# Patient Record
Sex: MALE | Race: WHITE | NOT HISPANIC OR LATINO
[De-identification: names, ages, dates, MRNs, and addresses within clinical notes are randomized per-mention and may not be internally consistent; named-entity substitution may affect disease eponyms.]

---

## 2017-03-24 ENCOUNTER — APPOINTMENT (OUTPATIENT)
Dept: UROLOGY | Facility: CLINIC | Age: 67
End: 2017-03-24

## 2017-03-24 ENCOUNTER — APPOINTMENT (OUTPATIENT)
Dept: UROLOGY | Facility: HOSPITAL | Age: 67
End: 2017-03-24

## 2017-03-24 VITALS
DIASTOLIC BLOOD PRESSURE: 70 MMHG | SYSTOLIC BLOOD PRESSURE: 122 MMHG | HEART RATE: 73 BPM | HEIGHT: 68 IN | OXYGEN SATURATION: 98 % | BODY MASS INDEX: 25.76 KG/M2 | WEIGHT: 170 LBS

## 2017-03-25 LAB — PSA SERPL-MCNC: 2.77 NG/ML

## 2017-08-27 ENCOUNTER — LABORATORY RESULT (OUTPATIENT)
Age: 67
End: 2017-08-27

## 2017-08-28 ENCOUNTER — APPOINTMENT (OUTPATIENT)
Dept: DERMATOLOGY | Facility: CLINIC | Age: 67
End: 2017-08-28
Payer: MEDICARE

## 2017-08-28 VITALS — SYSTOLIC BLOOD PRESSURE: 114 MMHG | DIASTOLIC BLOOD PRESSURE: 66 MMHG

## 2017-08-28 PROCEDURE — 17000 DESTRUCT PREMALG LESION: CPT | Mod: GC

## 2017-08-28 PROCEDURE — 11100 BX SKIN SUBCUTANEOUS&/MUCOUS MEMBRANE 1 LESION: CPT | Mod: 59,GC

## 2017-08-28 PROCEDURE — 99213 OFFICE O/P EST LOW 20 MIN: CPT | Mod: 25,GC

## 2017-08-28 PROCEDURE — 17003 DESTRUCT PREMALG LES 2-14: CPT | Mod: GC

## 2017-09-05 ENCOUNTER — APPOINTMENT (OUTPATIENT)
Dept: DERMATOLOGY | Facility: CLINIC | Age: 67
End: 2017-09-05

## 2017-09-06 ENCOUNTER — MEDICATION RENEWAL (OUTPATIENT)
Age: 67
End: 2017-09-06

## 2017-10-02 ENCOUNTER — APPOINTMENT (OUTPATIENT)
Dept: DERMATOLOGY | Facility: CLINIC | Age: 67
End: 2017-10-02
Payer: MEDICARE

## 2017-10-02 VITALS — SYSTOLIC BLOOD PRESSURE: 112 MMHG | DIASTOLIC BLOOD PRESSURE: 68 MMHG

## 2017-10-02 DIAGNOSIS — L98.8 OTHER SPECIFIED DISORDERS OF THE SKIN AND SUBCUTANEOUS TISSUE: ICD-10-CM

## 2017-10-02 DIAGNOSIS — D48.9 NEOPLASM OF UNCERTAIN BEHAVIOR, UNSPECIFIED: ICD-10-CM

## 2017-10-02 DIAGNOSIS — T14.8 OTHER INJURY OF UNSPECIFIED BODY REGION: ICD-10-CM

## 2017-10-02 DIAGNOSIS — D48.5 NEOPLASM OF UNCERTAIN BEHAVIOR OF SKIN: ICD-10-CM

## 2017-10-02 PROCEDURE — 99213 OFFICE O/P EST LOW 20 MIN: CPT | Mod: GC

## 2017-12-21 ENCOUNTER — APPOINTMENT (OUTPATIENT)
Dept: DERMATOLOGY | Facility: CLINIC | Age: 67
End: 2017-12-21

## 2017-12-29 ENCOUNTER — APPOINTMENT (OUTPATIENT)
Dept: UROLOGY | Facility: CLINIC | Age: 67
End: 2017-12-29

## 2018-05-18 ENCOUNTER — APPOINTMENT (OUTPATIENT)
Dept: DERMATOLOGY | Facility: CLINIC | Age: 68
End: 2018-05-18
Payer: MEDICARE

## 2018-05-18 VITALS — DIASTOLIC BLOOD PRESSURE: 80 MMHG | SYSTOLIC BLOOD PRESSURE: 108 MMHG

## 2018-05-18 DIAGNOSIS — D23.9 OTHER BENIGN NEOPLASM OF SKIN, UNSPECIFIED: ICD-10-CM

## 2018-05-18 DIAGNOSIS — L82.1 OTHER SEBORRHEIC KERATOSIS: ICD-10-CM

## 2018-05-18 PROCEDURE — 99214 OFFICE O/P EST MOD 30 MIN: CPT | Mod: 25,GC

## 2018-05-18 PROCEDURE — 17003 DESTRUCT PREMALG LES 2-14: CPT | Mod: GC

## 2018-05-18 PROCEDURE — 17000 DESTRUCT PREMALG LESION: CPT | Mod: GC

## 2018-10-01 ENCOUNTER — APPOINTMENT (OUTPATIENT)
Dept: DERMATOLOGY | Facility: CLINIC | Age: 68
End: 2018-10-01
Payer: MEDICARE

## 2018-10-01 VITALS — DIASTOLIC BLOOD PRESSURE: 82 MMHG | SYSTOLIC BLOOD PRESSURE: 122 MMHG

## 2018-10-01 PROCEDURE — 17000 DESTRUCT PREMALG LESION: CPT

## 2018-10-01 PROCEDURE — 99214 OFFICE O/P EST MOD 30 MIN: CPT | Mod: 25

## 2018-10-08 ENCOUNTER — TRANSCRIPTION ENCOUNTER (OUTPATIENT)
Age: 68
End: 2018-10-08

## 2018-10-08 ENCOUNTER — MEDICATION RENEWAL (OUTPATIENT)
Age: 68
End: 2018-10-08

## 2018-11-05 PROBLEM — D23.9 BLUE NEVUS: Status: ACTIVE | Noted: 2018-05-18

## 2019-04-25 ENCOUNTER — TRANSCRIPTION ENCOUNTER (OUTPATIENT)
Age: 69
End: 2019-04-25

## 2019-05-02 ENCOUNTER — TRANSCRIPTION ENCOUNTER (OUTPATIENT)
Age: 69
End: 2019-05-02

## 2019-05-03 ENCOUNTER — APPOINTMENT (OUTPATIENT)
Dept: DERMATOLOGY | Facility: CLINIC | Age: 69
End: 2019-05-03
Payer: MEDICARE

## 2019-05-03 VITALS — SYSTOLIC BLOOD PRESSURE: 126 MMHG | DIASTOLIC BLOOD PRESSURE: 72 MMHG

## 2019-05-03 PROCEDURE — 99214 OFFICE O/P EST MOD 30 MIN: CPT

## 2019-05-13 ENCOUNTER — APPOINTMENT (OUTPATIENT)
Dept: FAMILY MEDICINE | Facility: CLINIC | Age: 69
End: 2019-05-13
Payer: MEDICARE

## 2019-05-13 VITALS
BODY MASS INDEX: 25.76 KG/M2 | WEIGHT: 170 LBS | SYSTOLIC BLOOD PRESSURE: 120 MMHG | HEIGHT: 68 IN | DIASTOLIC BLOOD PRESSURE: 80 MMHG | RESPIRATION RATE: 16 BRPM | OXYGEN SATURATION: 99 % | HEART RATE: 74 BPM

## 2019-05-13 DIAGNOSIS — E78.5 HYPERLIPIDEMIA, UNSPECIFIED: ICD-10-CM

## 2019-05-13 DIAGNOSIS — Z00.00 ENCOUNTER FOR GENERAL ADULT MEDICAL EXAMINATION W/OUT ABNORMAL FINDINGS: ICD-10-CM

## 2019-05-13 DIAGNOSIS — Z12.5 ENCOUNTER FOR SCREENING FOR MALIGNANT NEOPLASM OF PROSTATE: ICD-10-CM

## 2019-05-13 PROCEDURE — 36415 COLL VENOUS BLD VENIPUNCTURE: CPT

## 2019-05-13 PROCEDURE — G0438: CPT

## 2019-05-14 LAB
25(OH)D3 SERPL-MCNC: 38.7 NG/ML
ALBUMIN SERPL ELPH-MCNC: 4.4 G/DL
ALP BLD-CCNC: 50 U/L
ALT SERPL-CCNC: 15 U/L
ANION GAP SERPL CALC-SCNC: 16 MMOL/L
APPEARANCE: CLEAR
AST SERPL-CCNC: 18 U/L
BACTERIA: NEGATIVE
BASOPHILS # BLD AUTO: 0.07 K/UL
BASOPHILS NFR BLD AUTO: 0.9 %
BILIRUB SERPL-MCNC: 0.5 MG/DL
BILIRUBIN URINE: NEGATIVE
BLOOD URINE: NEGATIVE
BUN SERPL-MCNC: 19 MG/DL
CALCIUM SERPL-MCNC: 9.6 MG/DL
CHLORIDE SERPL-SCNC: 101 MMOL/L
CHOLEST SERPL-MCNC: 165 MG/DL
CHOLEST/HDLC SERPL: 2.4 RATIO
CO2 SERPL-SCNC: 24 MMOL/L
COLOR: NORMAL
CREAT SERPL-MCNC: 0.93 MG/DL
EOSINOPHIL # BLD AUTO: 0.23 K/UL
EOSINOPHIL NFR BLD AUTO: 3.1 %
GLUCOSE QUALITATIVE U: NEGATIVE
GLUCOSE SERPL-MCNC: 101 MG/DL
HCT VFR BLD CALC: 44.3 %
HDLC SERPL-MCNC: 68 MG/DL
HGB BLD-MCNC: 14.7 G/DL
HYALINE CASTS: 0 /LPF
IMM GRANULOCYTES NFR BLD AUTO: 0.3 %
KETONES URINE: NEGATIVE
LDLC SERPL CALC-MCNC: 81 MG/DL
LEUKOCYTE ESTERASE URINE: NEGATIVE
LYMPHOCYTES # BLD AUTO: 1.43 K/UL
LYMPHOCYTES NFR BLD AUTO: 19.1 %
MAN DIFF?: NORMAL
MCHC RBC-ENTMCNC: 30.4 PG
MCHC RBC-ENTMCNC: 33.2 GM/DL
MCV RBC AUTO: 91.7 FL
MICROSCOPIC-UA: NORMAL
MONOCYTES # BLD AUTO: 0.48 K/UL
MONOCYTES NFR BLD AUTO: 6.4 %
NEUTROPHILS # BLD AUTO: 5.26 K/UL
NEUTROPHILS NFR BLD AUTO: 70.2 %
NITRITE URINE: NEGATIVE
PH URINE: 6.5
PLATELET # BLD AUTO: 279 K/UL
POTASSIUM SERPL-SCNC: 4.6 MMOL/L
PROT SERPL-MCNC: 6.5 G/DL
PROTEIN URINE: NEGATIVE
PSA SERPL-MCNC: 3.02 NG/ML
RBC # BLD: 4.83 M/UL
RBC # FLD: 13.3 %
RED BLOOD CELLS URINE: 1 /HPF
SODIUM SERPL-SCNC: 141 MMOL/L
SPECIFIC GRAVITY URINE: 1.01
SQUAMOUS EPITHELIAL CELLS: 0 /HPF
TRIGL SERPL-MCNC: 82 MG/DL
TSH SERPL-ACNC: 1.05 UIU/ML
UROBILINOGEN URINE: NORMAL
WBC # FLD AUTO: 7.49 K/UL
WHITE BLOOD CELLS URINE: 0 /HPF

## 2019-05-15 LAB
ESTIMATED AVERAGE GLUCOSE: 111 MG/DL
HBA1C MFR BLD HPLC: 5.5 %

## 2019-08-22 ENCOUNTER — RX RENEWAL (OUTPATIENT)
Age: 69
End: 2019-08-22

## 2019-08-30 ENCOUNTER — APPOINTMENT (OUTPATIENT)
Dept: INTERNAL MEDICINE | Facility: CLINIC | Age: 69
End: 2019-08-30
Payer: MEDICARE

## 2019-08-30 VITALS
HEIGHT: 68 IN | WEIGHT: 170 LBS | OXYGEN SATURATION: 99 % | BODY MASS INDEX: 25.76 KG/M2 | DIASTOLIC BLOOD PRESSURE: 60 MMHG | HEART RATE: 74 BPM | SYSTOLIC BLOOD PRESSURE: 120 MMHG

## 2019-08-30 DIAGNOSIS — Z83.79 FAMILY HISTORY OF OTHER DISEASES OF THE DIGESTIVE SYSTEM: ICD-10-CM

## 2019-08-30 PROCEDURE — 99204 OFFICE O/P NEW MOD 45 MIN: CPT

## 2019-08-30 NOTE — ASSESSMENT
[FreeTextEntry1] : Needs screening colon\par gerd issues and family history will also need egd\par \par on baby aspirin for no indication can stop 3-4 day prior

## 2019-08-30 NOTE — HISTORY OF PRESENT ILLNESS
[FreeTextEntry1] : Last colon 10 years ago\par no family history of colon issue\par no bowel problems\par family history of ulcer disease and cirrhosis\par some issue with gerd worse at night\par

## 2019-09-19 ENCOUNTER — LABORATORY RESULT (OUTPATIENT)
Age: 69
End: 2019-09-19

## 2019-09-19 ENCOUNTER — APPOINTMENT (OUTPATIENT)
Dept: INTERNAL MEDICINE | Facility: CLINIC | Age: 69
End: 2019-09-19
Payer: MEDICARE

## 2019-09-19 DIAGNOSIS — Z12.11 ENCOUNTER FOR SCREENING FOR MALIGNANT NEOPLASM OF COLON: ICD-10-CM

## 2019-09-19 PROCEDURE — G0121 COLON CA SCRN NOT HI RSK IND: CPT

## 2019-09-19 PROCEDURE — 43239 EGD BIOPSY SINGLE/MULTIPLE: CPT | Mod: 52

## 2019-10-07 ENCOUNTER — APPOINTMENT (OUTPATIENT)
Dept: DERMATOLOGY | Facility: CLINIC | Age: 69
End: 2019-10-07
Payer: MEDICARE

## 2019-10-07 PROCEDURE — 17000 DESTRUCT PREMALG LESION: CPT

## 2019-10-07 PROCEDURE — 99214 OFFICE O/P EST MOD 30 MIN: CPT | Mod: 25

## 2019-10-07 PROCEDURE — 17003 DESTRUCT PREMALG LES 2-14: CPT

## 2019-10-07 NOTE — HISTORY OF PRESENT ILLNESS
[FreeTextEntry1] : F/u: actinic keratoses [de-identified] : 68 yo M w/ PMH of NMSC (L upper back, type unknown, >10 yrs ago) here for follow up of:\par \par Notes a growth on the R ear lobule and L upper arm. Itchy. Flakes off and scabs up again. \par \par 2) Rosacea. Using clindamycin qAM to face and upper chest with good improvement\par 2) Seb derm on face/scalp. Using desonide as needed with improvement.

## 2020-09-30 ENCOUNTER — APPOINTMENT (OUTPATIENT)
Dept: INTERNAL MEDICINE | Facility: CLINIC | Age: 70
End: 2020-09-30
Payer: MEDICARE

## 2020-09-30 VITALS
WEIGHT: 173 LBS | TEMPERATURE: 97.7 F | HEIGHT: 68 IN | SYSTOLIC BLOOD PRESSURE: 121 MMHG | OXYGEN SATURATION: 95 % | HEART RATE: 64 BPM | DIASTOLIC BLOOD PRESSURE: 75 MMHG | BODY MASS INDEX: 26.22 KG/M2

## 2020-09-30 VITALS
WEIGHT: 173 LBS | BODY MASS INDEX: 26.22 KG/M2 | DIASTOLIC BLOOD PRESSURE: 70 MMHG | HEIGHT: 68 IN | SYSTOLIC BLOOD PRESSURE: 120 MMHG

## 2020-09-30 DIAGNOSIS — K22.70 BARRETT'S ESOPHAGUS W/OUT DYSPLASIA: ICD-10-CM

## 2020-09-30 DIAGNOSIS — Z23 ENCOUNTER FOR IMMUNIZATION: ICD-10-CM

## 2020-09-30 DIAGNOSIS — K57.92 DIVERTICULITIS OF INTESTINE, PART UNSPECIFIED, W/OUT PERFORATION OR ABSCESS W/OUT BLEEDING: ICD-10-CM

## 2020-09-30 DIAGNOSIS — K57.90 DIVERTICULOSIS OF INTESTINE, PART UNSPECIFIED, W/OUT PERFORATION OR ABSCESS W/OUT BLEEDING: ICD-10-CM

## 2020-09-30 PROCEDURE — G0009: CPT

## 2020-09-30 PROCEDURE — 99214 OFFICE O/P EST MOD 30 MIN: CPT | Mod: 25

## 2020-09-30 PROCEDURE — 90732 PPSV23 VACC 2 YRS+ SUBQ/IM: CPT

## 2020-09-30 NOTE — PHYSICAL EXAM
[PERRL With Normal Accommodation] : pupils were equal in size, round, and reactive to light [Neck Appearance] : the appearance of the neck was normal [Sclera] : the sclera and conjunctiva were normal [] : no respiratory distress [Apical Impulse] : the apical impulse was normal [Respiration, Rhythm And Depth] : normal respiratory rhythm and effort [Heart Rate And Rhythm] : heart rate was normal and rhythm regular [Arterial Pulses Carotid] : carotid pulses were normal with no bruits [Abdominal Aorta] : the abdominal aorta was normal [Bowel Sounds] : normal bowel sounds [Abdomen Soft] : soft

## 2020-09-30 NOTE — ASSESSMENT
[FreeTextEntry1] : Possible asymptomatic diverticulitis\par augmentim 875 bid x 5 days\par continue omeprazole for gerd and f/u egd 1 year\par \par

## 2021-01-13 ENCOUNTER — TRANSCRIPTION ENCOUNTER (OUTPATIENT)
Age: 71
End: 2021-01-13

## 2021-02-12 ENCOUNTER — APPOINTMENT (OUTPATIENT)
Dept: DERMATOLOGY | Facility: CLINIC | Age: 71
End: 2021-02-12
Payer: MEDICARE

## 2021-02-12 DIAGNOSIS — L21.9 SEBORRHEIC DERMATITIS, UNSPECIFIED: ICD-10-CM

## 2021-02-12 DIAGNOSIS — Z85.828 PERSONAL HISTORY OF OTHER MALIGNANT NEOPLASM OF SKIN: ICD-10-CM

## 2021-02-12 DIAGNOSIS — Z12.83 ENCOUNTER FOR SCREENING FOR MALIGNANT NEOPLASM OF SKIN: ICD-10-CM

## 2021-02-12 DIAGNOSIS — L57.0 ACTINIC KERATOSIS: ICD-10-CM

## 2021-02-12 DIAGNOSIS — L73.9 FOLLICULAR DISORDER, UNSPECIFIED: ICD-10-CM

## 2021-02-12 DIAGNOSIS — L71.9 ROSACEA, UNSPECIFIED: ICD-10-CM

## 2021-02-12 PROCEDURE — 17000 DESTRUCT PREMALG LESION: CPT

## 2021-02-12 PROCEDURE — 99214 OFFICE O/P EST MOD 30 MIN: CPT | Mod: 25

## 2021-02-12 NOTE — HISTORY OF PRESENT ILLNESS
[FreeTextEntry1] : F/u: actinic keratoses; bumps on the body [de-identified] : 72 yo M w/ PMH of NMSC (L upper back, type unknown, >10 yrs ago) here for follow up of:\par \par Notes itchy bumps that start as a white head on the arms and chest that improve with clindamycin. \par \par Requesting refill of Keto shampoo for Seb Derm

## 2021-02-12 NOTE — PHYSICAL EXAM
[Alert] : alert [Oriented x 3] : ~L oriented x 3 [Well Nourished] : well nourished [Conjunctiva Non-injected] : conjunctiva non-injected [No Visual Lymphadenopathy] : no visual  lymphadenopathy [No Clubbing] : no clubbing [No Edema] : no edema [No Bromhidrosis] : no bromhidrosis [No Chromhidrosis] : no chromhidrosis [FreeTextEntry3] : The patient is well-appearing, in no acute distress, alert and oriented x 3. Mood and affect are normal. A complete cutaneous examination of the scalp, face, neck, chest, abdomen, back, bilateral arms, bilateral legs, buttocks, digits, nails, eyelids, conjunctiva and lips reveals the following significant findings:\par  \par gritty erythematous papule on L sideburn\par Few folliculocentric excoriated papules on the L shoulder and arm\par Rare pustules on the cheeks\par Well-healed scar on the back\par

## 2021-03-08 ENCOUNTER — NON-APPOINTMENT (OUTPATIENT)
Age: 71
End: 2021-03-08

## 2021-03-08 ENCOUNTER — TRANSCRIPTION ENCOUNTER (OUTPATIENT)
Age: 71
End: 2021-03-08

## 2021-03-08 ENCOUNTER — INPATIENT (INPATIENT)
Facility: HOSPITAL | Age: 71
LOS: 2 days | Discharge: HOME | End: 2021-03-11
Attending: SURGERY | Admitting: SURGERY
Payer: MEDICARE

## 2021-03-08 VITALS
RESPIRATION RATE: 20 BRPM | HEART RATE: 100 BPM | DIASTOLIC BLOOD PRESSURE: 90 MMHG | SYSTOLIC BLOOD PRESSURE: 140 MMHG | TEMPERATURE: 98 F | OXYGEN SATURATION: 96 %

## 2021-03-08 DIAGNOSIS — Z90.49 ACQUIRED ABSENCE OF OTHER SPECIFIED PARTS OF DIGESTIVE TRACT: Chronic | ICD-10-CM

## 2021-03-08 DIAGNOSIS — K81.0 ACUTE CHOLECYSTITIS: ICD-10-CM

## 2021-03-08 DIAGNOSIS — Z98.890 OTHER SPECIFIED POSTPROCEDURAL STATES: Chronic | ICD-10-CM

## 2021-03-08 LAB
ALBUMIN SERPL ELPH-MCNC: 3.8 G/DL — SIGNIFICANT CHANGE UP (ref 3.3–5)
ALP SERPL-CCNC: 63 U/L — SIGNIFICANT CHANGE UP (ref 40–120)
ALT FLD-CCNC: 18 U/L — SIGNIFICANT CHANGE UP (ref 4–41)
ANION GAP SERPL CALC-SCNC: 10 MMOL/L — SIGNIFICANT CHANGE UP (ref 7–14)
AST SERPL-CCNC: 19 U/L — SIGNIFICANT CHANGE UP (ref 4–40)
BASOPHILS # BLD AUTO: 0.04 K/UL — SIGNIFICANT CHANGE UP (ref 0–0.2)
BASOPHILS NFR BLD AUTO: 0.2 % — SIGNIFICANT CHANGE UP (ref 0–2)
BILIRUB SERPL-MCNC: 0.8 MG/DL — SIGNIFICANT CHANGE UP (ref 0.2–1.2)
BUN SERPL-MCNC: 16 MG/DL — SIGNIFICANT CHANGE UP (ref 7–23)
CALCIUM SERPL-MCNC: 9.6 MG/DL — SIGNIFICANT CHANGE UP (ref 8.4–10.5)
CHLORIDE SERPL-SCNC: 98 MMOL/L — SIGNIFICANT CHANGE UP (ref 98–107)
CO2 SERPL-SCNC: 26 MMOL/L — SIGNIFICANT CHANGE UP (ref 22–31)
CREAT SERPL-MCNC: 1.11 MG/DL — SIGNIFICANT CHANGE UP (ref 0.5–1.3)
EOSINOPHIL # BLD AUTO: 0.08 K/UL — SIGNIFICANT CHANGE UP (ref 0–0.5)
EOSINOPHIL NFR BLD AUTO: 0.4 % — SIGNIFICANT CHANGE UP (ref 0–6)
GLUCOSE SERPL-MCNC: 134 MG/DL — HIGH (ref 70–99)
HCT VFR BLD CALC: 40.3 % — SIGNIFICANT CHANGE UP (ref 39–50)
HGB BLD-MCNC: 13.1 G/DL — SIGNIFICANT CHANGE UP (ref 13–17)
IANC: 19.57 K/UL — HIGH (ref 1.5–8.5)
IMM GRANULOCYTES NFR BLD AUTO: 0.6 % — SIGNIFICANT CHANGE UP (ref 0–1.5)
LIDOCAIN IGE QN: 13 U/L — SIGNIFICANT CHANGE UP (ref 7–60)
LYMPHOCYTES # BLD AUTO: 0.71 K/UL — LOW (ref 1–3.3)
LYMPHOCYTES # BLD AUTO: 3.2 % — LOW (ref 13–44)
MCHC RBC-ENTMCNC: 29.6 PG — SIGNIFICANT CHANGE UP (ref 27–34)
MCHC RBC-ENTMCNC: 32.5 GM/DL — SIGNIFICANT CHANGE UP (ref 32–36)
MCV RBC AUTO: 91 FL — SIGNIFICANT CHANGE UP (ref 80–100)
MONOCYTES # BLD AUTO: 1.39 K/UL — HIGH (ref 0–0.9)
MONOCYTES NFR BLD AUTO: 6.3 % — SIGNIFICANT CHANGE UP (ref 2–14)
NEUTROPHILS # BLD AUTO: 19.57 K/UL — HIGH (ref 1.8–7.4)
NEUTROPHILS NFR BLD AUTO: 89.3 % — HIGH (ref 43–77)
NRBC # BLD: 0 /100 WBCS — SIGNIFICANT CHANGE UP
NRBC # FLD: 0 K/UL — SIGNIFICANT CHANGE UP
PLATELET # BLD AUTO: 279 K/UL — SIGNIFICANT CHANGE UP (ref 150–400)
POTASSIUM SERPL-MCNC: 4 MMOL/L — SIGNIFICANT CHANGE UP (ref 3.5–5.3)
POTASSIUM SERPL-SCNC: 4 MMOL/L — SIGNIFICANT CHANGE UP (ref 3.5–5.3)
PROT SERPL-MCNC: 7.2 G/DL — SIGNIFICANT CHANGE UP (ref 6–8.3)
RBC # BLD: 4.43 M/UL — SIGNIFICANT CHANGE UP (ref 4.2–5.8)
RBC # FLD: 12.9 % — SIGNIFICANT CHANGE UP (ref 10.3–14.5)
SARS-COV-2 RNA SPEC QL NAA+PROBE: SIGNIFICANT CHANGE UP
SODIUM SERPL-SCNC: 134 MMOL/L — LOW (ref 135–145)
WBC # BLD: 21.92 K/UL — HIGH (ref 3.8–10.5)
WBC # FLD AUTO: 21.92 K/UL — HIGH (ref 3.8–10.5)

## 2021-03-08 PROCEDURE — 76705 ECHO EXAM OF ABDOMEN: CPT | Mod: 26

## 2021-03-08 PROCEDURE — 99285 EMERGENCY DEPT VISIT HI MDM: CPT | Mod: CS

## 2021-03-08 PROCEDURE — 71045 X-RAY EXAM CHEST 1 VIEW: CPT | Mod: 26

## 2021-03-08 RX ORDER — ENOXAPARIN SODIUM 100 MG/ML
40 INJECTION SUBCUTANEOUS DAILY
Refills: 0 | Status: DISCONTINUED | OUTPATIENT
Start: 2021-03-08 | End: 2021-03-09

## 2021-03-08 RX ORDER — SODIUM CHLORIDE 9 MG/ML
1000 INJECTION INTRAMUSCULAR; INTRAVENOUS; SUBCUTANEOUS ONCE
Refills: 0 | Status: COMPLETED | OUTPATIENT
Start: 2021-03-08 | End: 2021-03-08

## 2021-03-08 RX ORDER — ASPIRIN/CALCIUM CARB/MAGNESIUM 324 MG
81 TABLET ORAL DAILY
Refills: 0 | Status: DISCONTINUED | OUTPATIENT
Start: 2021-03-08 | End: 2021-03-09

## 2021-03-08 RX ORDER — PIPERACILLIN AND TAZOBACTAM 4; .5 G/20ML; G/20ML
3.38 INJECTION, POWDER, LYOPHILIZED, FOR SOLUTION INTRAVENOUS EVERY 8 HOURS
Refills: 0 | Status: DISCONTINUED | OUTPATIENT
Start: 2021-03-08 | End: 2021-03-08

## 2021-03-08 RX ORDER — SIMVASTATIN 20 MG/1
20 TABLET, FILM COATED ORAL AT BEDTIME
Refills: 0 | Status: DISCONTINUED | OUTPATIENT
Start: 2021-03-08 | End: 2021-03-11

## 2021-03-08 RX ORDER — PANTOPRAZOLE SODIUM 20 MG/1
40 TABLET, DELAYED RELEASE ORAL
Refills: 0 | Status: DISCONTINUED | OUTPATIENT
Start: 2021-03-08 | End: 2021-03-11

## 2021-03-08 RX ORDER — SODIUM CHLORIDE 9 MG/ML
1000 INJECTION, SOLUTION INTRAVENOUS
Refills: 0 | Status: DISCONTINUED | OUTPATIENT
Start: 2021-03-08 | End: 2021-03-10

## 2021-03-08 RX ORDER — PIPERACILLIN AND TAZOBACTAM 4; .5 G/20ML; G/20ML
3.38 INJECTION, POWDER, LYOPHILIZED, FOR SOLUTION INTRAVENOUS EVERY 8 HOURS
Refills: 0 | Status: DISCONTINUED | OUTPATIENT
Start: 2021-03-08 | End: 2021-03-11

## 2021-03-08 RX ORDER — PIPERACILLIN AND TAZOBACTAM 4; .5 G/20ML; G/20ML
3.38 INJECTION, POWDER, LYOPHILIZED, FOR SOLUTION INTRAVENOUS ONCE
Refills: 0 | Status: COMPLETED | OUTPATIENT
Start: 2021-03-08 | End: 2021-03-08

## 2021-03-08 RX ORDER — PIPERACILLIN AND TAZOBACTAM 4; .5 G/20ML; G/20ML
3.38 INJECTION, POWDER, LYOPHILIZED, FOR SOLUTION INTRAVENOUS ONCE
Refills: 0 | Status: DISCONTINUED | OUTPATIENT
Start: 2021-03-08 | End: 2021-03-08

## 2021-03-08 RX ORDER — MORPHINE SULFATE 50 MG/1
4 CAPSULE, EXTENDED RELEASE ORAL ONCE
Refills: 0 | Status: DISCONTINUED | OUTPATIENT
Start: 2021-03-08 | End: 2021-03-08

## 2021-03-08 RX ORDER — ACETAMINOPHEN 500 MG
975 TABLET ORAL EVERY 6 HOURS
Refills: 0 | Status: DISCONTINUED | OUTPATIENT
Start: 2021-03-08 | End: 2021-03-09

## 2021-03-08 RX ADMIN — SODIUM CHLORIDE 1000 MILLILITER(S): 9 INJECTION INTRAMUSCULAR; INTRAVENOUS; SUBCUTANEOUS at 09:00

## 2021-03-08 RX ADMIN — SIMVASTATIN 20 MILLIGRAM(S): 20 TABLET, FILM COATED ORAL at 22:55

## 2021-03-08 RX ADMIN — Medication 975 MILLIGRAM(S): at 20:06

## 2021-03-08 RX ADMIN — Medication 975 MILLIGRAM(S): at 23:49

## 2021-03-08 RX ADMIN — PIPERACILLIN AND TAZOBACTAM 200 GRAM(S): 4; .5 INJECTION, POWDER, LYOPHILIZED, FOR SOLUTION INTRAVENOUS at 11:42

## 2021-03-08 RX ADMIN — Medication 975 MILLIGRAM(S): at 17:38

## 2021-03-08 RX ADMIN — PIPERACILLIN AND TAZOBACTAM 3.38 GRAM(S): 4; .5 INJECTION, POWDER, LYOPHILIZED, FOR SOLUTION INTRAVENOUS at 15:04

## 2021-03-08 RX ADMIN — PIPERACILLIN AND TAZOBACTAM 25 GRAM(S): 4; .5 INJECTION, POWDER, LYOPHILIZED, FOR SOLUTION INTRAVENOUS at 19:00

## 2021-03-08 RX ADMIN — SODIUM CHLORIDE 75 MILLILITER(S): 9 INJECTION, SOLUTION INTRAVENOUS at 15:33

## 2021-03-08 NOTE — H&P ADULT - ASSESSMENT
ASSESSMENT:  72 y/o male with history of HLD, Inguinal hernia repair, Appendectomy, and GERD presents with 3 days of abdominal n/w exam and labs concerning for acute cholecystitis. His RUQ U/S shows gallbladder wall thickening and pericholecystic fluid.    Plan:    - Case discussed with Dr. Jarret Peres  - NPO/ROCKY  - Zosyn for acute cholecystitis  - Planning for lap octavio this admission  - Continue home medications

## 2021-03-08 NOTE — ED PROVIDER NOTE - OBJECTIVE STATEMENT
71 yoM, PMHx of HLD presenting to ED with continued RUQ pain. Pain for 3 days. Seen at [facility] and had extensive workup with CTA CAP, RUQ sono and labs/trop. Cholelithiasis seen but otherwise reassuring workup. Pain persistent. Constant. No related to eating. No fever, chills, NVD. No SOB. Pain is near diaphragm on R/chest but more localized to RUQ. No tobacco use. No drug use. No etoh abuse.

## 2021-03-08 NOTE — ED ADULT NURSE NOTE - OBJECTIVE STATEMENT
Pt A+OX4 c/o right upper quadrant rib area pain since Friday.  Denies N/V/D.  Was seen at [facility] where he had an US and CT which were negative but pain continues and came here today.  +Trent's sign.  Appears SOBOE.  EKG done.  CM placed.  VSS.  Labs obtained and sent as ordered.  #20g SL R AC placed.  Kept NPO.

## 2021-03-08 NOTE — ED ADULT TRIAGE NOTE - CHIEF COMPLAINT QUOTE
pt c/o URQ pain with intermittent diffuse chest pain, SOB x 4 days. pt was evaluated at [facility] on Saturday with no significant findings, pain is still present. pt denies fever, cough, n/v/d. Hx of HLD.

## 2021-03-08 NOTE — H&P ADULT - NSHPREVIEWOFSYSTEMS_GEN_ALL_CORE
REVIEW OF SYSTEMS:  CONSTITUTIONAL: No fever, weight loss, or fatigue  EYES: No eye pain, visual disturbances, or discharge  ENMT:  No tinnitus, vertigo, dysphagia  RESPIRATORY: No cough, SOB, wheezing, chills or hemoptysis  CARDIOVASCULAR: No chest pain, palpitations, dizziness, or leg swelling  GASTROINTESTINAL: Having abdominal pain. No nausea, vomiting, or diarrhea.  GENITOURINARY: No dysuria, frequency, hematuria, or incontinence.  NEUROLOGICAL: No headaches, memory loss, loss of strength, numbness, or tremors  SKIN: No itching, burning, rashes, or lesions   ENDOCRINE: No heat or cold intolerance  MUSCULOSKELETAL: No joint pain or swelling

## 2021-03-08 NOTE — H&P ADULT - NSHPPHYSICALEXAM_GEN_ALL_CORE
General: NAD, resting comfortably  HEENT: NC/AT, EOMI, normal hearing  Pulmonary: normal resp effort  Cardiovascular: NSR  Abdominal: soft, ND, tender to palpation in epigastric region, - kelley sign  Extremities: WWP, normal strength, no clubbing/cyanosis/edema  Neuro: A/O x 3, no focal deficits  Pulses: palpable distal pulses

## 2021-03-08 NOTE — ED PROCEDURE NOTE - NS ED PROCEDURE ASSISTED BY
Assistance was available/The procedure was performed independently Supervision was available/Assistance was available/The procedure was performed independently

## 2021-03-08 NOTE — ED PROVIDER NOTE - ATTENDING CONTRIBUTION TO CARE
71M with right sided upper abdominal pain/lower right chest pain. patient states he was seen and evaluated at [facility] for similar, dx with gallstones, no acute cholecystitis and dc'd home. pain has persisted and now here for eval. + RUQ and right chest pain. no vomiting. only mild nausea.     ***GEN - NAD; well appearing; A+O x3 ***HEAD - NC/AT ***EYES/NOSE - PERRL, EOMI, mucous membranes moist, no discharge ***THROAT: Oral cavity and pharynx normal. No inflammation, swelling, exudate, or lesions.  ***NECK: Neck supple, non-tender without lymphadenopathy, no masses, no thyromegaly.   ***PULMONARY - CTA b/l, symmetric breath sounds. ***CARDIAC -s1s2, RRR, no M,G,R  ***ABDOMEN - +BS, ND, ttp RUQ soft, no guarding, no rebound, no masses   ***BACK - no CVA tenderness, Normal  spine ***EXTREMITIES - symmetric pulses, 2+ dp, capillary refill < 2 seconds, no clubbing, no cyanosis, no edema ***SKIN - no rash or bruising   ***NEUROLOGIC - alert, CN 2-12 intact    MDM: 71M with gallstones, r/o acute cholecystitis, choledocholithiasis, pancreatitis. labs, ruq us, pain control. 71M with right sided upper abdominal pain/lower right chest pain. patient states he was seen and evaluated at [facility] for similar, dx with gallstones, no acute cholecystitis and dc'd home. pain has persisted and now here for eval. + RUQ and right chest pain. no vomiting. only mild nausea.     ***GEN - NAD; well appearing; A+O x3 ***HEAD - NC/AT ***EYES/NOSE - PERRL, EOMI, mucous membranes moist, no discharge ***THROAT: Oral cavity and pharynx normal. No inflammation, swelling, exudate, or lesions.  ***NECK: Neck supple, non-tender without lymphadenopathy, no masses, no thyromegaly.   ***PULMONARY - CTA b/l, symmetric breath sounds. ***CARDIAC -s1s2, RRR, no M,G,R  ***ABDOMEN - +BS, ND, ttp RUQ soft, no guarding, no rebound, no masses   ***BACK - no CVA tenderness, Normal  spine ***EXTREMITIES - symmetric pulses, 2+ dp, capillary refill < 2 seconds, no clubbing, no cyanosis, no edema ***SKIN - no rash or bruising   ***NEUROLOGIC - alert, CN 2-12 intact.    MDM: 71M with gallstones, r/o acute cholecystitis, choledocholithiasis, pancreatitis. labs, ruq us, pain control.

## 2021-03-08 NOTE — ED PROVIDER NOTE - PROGRESS NOTE DETAILS
Antonio, PGY3- sono with immobile stone, concerning for acute octavio. Zosyn given. Admitted to Dr. Jarret Peres.

## 2021-03-08 NOTE — ED PROVIDER NOTE - CLINICAL SUMMARY MEDICAL DECISION MAKING FREE TEXT BOX
Antonio, PGY3- 71 yoM, PMHx of HLD presenting to ED with continued RUQ pain. Pain for 3 days. Seen at [facility] and had extensive workup with CTA CAP, RUQ sono and labs/trop. Cholelithiasis seen but otherwise reassuring workup.  given persistent pain and localized RUQ ttp will assess for hepatobiliary dz. Not consistent with ACS/PE. Normal aorta at OSH. No traumatic sequale on imaging.  analgesia, blood work, RUQ sono, reeval

## 2021-03-08 NOTE — ED PROVIDER NOTE - PHYSICAL EXAMINATION
General: alert, conversant, looks well, vitals reassuring  Head: atraumatic, normocephalic  Eyes: PERRL, EOMI, no scleral icterus  ENT: no epistaxis, normal phonation, airway patent  Neck: full ROM, no midline ttp  CV: RRR, no murmurs, HDS  Pulm: lungs CTA b/l, no wheezing, no respiratory distress  GI: RUQ ttp, no lower abd ttp, no masses, no distension   Back: normal ROM, no midline ttp, no signs of trauma  Extremities: normal ROM, joints stable, distal pulses intact, no edema  Neuro: alert, oriented x3, moving all extremities, interactive, normal speech/memory/gait   Derm: warm, dry, normal color

## 2021-03-08 NOTE — H&P ADULT - ATTENDING COMMENTS
Date of service 3/8/2021    Patient seen and examined  Labs and imaging reviewed  Acute cholecystitis  Admit, IV antibiotics  OR planning for laparoscopic cholecystectomy

## 2021-03-08 NOTE — H&P ADULT - NSHPLABSRESULTS_GEN_ALL_CORE
LABS:                        13.1   21.92 )-----------( 279      ( 08 Mar 2021 09:12 )             40.3     03-08    134<L>  |  98  |  16  ----------------------------<  134<H>  4.0   |  26  |  1.11    Ca    9.6      08 Mar 2021 09:12    TPro  7.2  /  Alb  3.8  /  TBili  0.8  /  DBili  x   /  AST  19  /  ALT  18  /  AlkPhos  63  03-08        CAPILLARY BLOOD GLUCOSE        LIVER FUNCTIONS - ( 08 Mar 2021 09:12 )  Alb: 3.8 g/dL / Pro: 7.2 g/dL / ALK PHOS: 63 U/L / ALT: 18 U/L / AST: 19 U/L / GGT: x             IMAGING:    < from: US Abdomen Upper Quadrant Right (03.08.21 @ 13:25) >    FINDINGS:    Liver: Fatty infiltration of the liver.  Bile ducts: Normal caliber. Common bile duct measures 5 mm.  Gallbladder: A nonmobile stone is present in the neck of the gallbladder measuring 1.5 cm. Wall thickening of gallbladder is nonspecific in the setting of ascites. A sonographic Trent sign could not be assessed due to patient analgesia.  Pancreas: Visualized portions are within normal limits.  Right kidney: 12.4 cm. No hydronephrosis.  Ascites: Trace ascites.  IVC: Visualized portions are within normal limits.    IMPRESSION:  *  Cholelithiasis with nonmobile stone in the neck of the gallbladder. Findings may be seen in the setting of acute cholecystitis in the appropriate clinical context. Correlate with clinical and laboratory findings. If additional imaging is clinically indicated, consider nuclear medicine hepatobiliary scan.  *  No biliary ductal dilatation.    < end of copied text >

## 2021-03-08 NOTE — ED ADULT NURSE NOTE - ED STAT RN HANDOFF DETAILS
endorsed to essu 2 rn. pt a&o x 3 nad noted. safety maintianed. fluids infusing, no s/s of infiltration

## 2021-03-09 ENCOUNTER — RESULT REVIEW (OUTPATIENT)
Age: 71
End: 2021-03-09

## 2021-03-09 LAB
ALBUMIN SERPL ELPH-MCNC: 3.2 G/DL — LOW (ref 3.3–5)
ALP SERPL-CCNC: 87 U/L — SIGNIFICANT CHANGE UP (ref 40–120)
ALT FLD-CCNC: 24 U/L — SIGNIFICANT CHANGE UP (ref 4–41)
ANION GAP SERPL CALC-SCNC: 12 MMOL/L — SIGNIFICANT CHANGE UP (ref 7–14)
ANION GAP SERPL CALC-SCNC: 14 MMOL/L — SIGNIFICANT CHANGE UP (ref 7–14)
APTT BLD: 29.3 SEC — SIGNIFICANT CHANGE UP (ref 27–36.3)
AST SERPL-CCNC: 23 U/L — SIGNIFICANT CHANGE UP (ref 4–40)
BASOPHILS # BLD AUTO: 0 K/UL — SIGNIFICANT CHANGE UP (ref 0–0.2)
BASOPHILS NFR BLD AUTO: 0 % — SIGNIFICANT CHANGE UP (ref 0–2)
BILIRUB SERPL-MCNC: 0.9 MG/DL — SIGNIFICANT CHANGE UP (ref 0.2–1.2)
BLD GP AB SCN SERPL QL: NEGATIVE — SIGNIFICANT CHANGE UP
BUN SERPL-MCNC: 18 MG/DL — SIGNIFICANT CHANGE UP (ref 7–23)
BUN SERPL-MCNC: 20 MG/DL — SIGNIFICANT CHANGE UP (ref 7–23)
CALCIUM SERPL-MCNC: 8.6 MG/DL — SIGNIFICANT CHANGE UP (ref 8.4–10.5)
CALCIUM SERPL-MCNC: 9.1 MG/DL — SIGNIFICANT CHANGE UP (ref 8.4–10.5)
CHLORIDE SERPL-SCNC: 100 MMOL/L — SIGNIFICANT CHANGE UP (ref 98–107)
CHLORIDE SERPL-SCNC: 100 MMOL/L — SIGNIFICANT CHANGE UP (ref 98–107)
CO2 SERPL-SCNC: 22 MMOL/L — SIGNIFICANT CHANGE UP (ref 22–31)
CO2 SERPL-SCNC: 25 MMOL/L — SIGNIFICANT CHANGE UP (ref 22–31)
CREAT SERPL-MCNC: 1.07 MG/DL — SIGNIFICANT CHANGE UP (ref 0.5–1.3)
CREAT SERPL-MCNC: 1.15 MG/DL — SIGNIFICANT CHANGE UP (ref 0.5–1.3)
EOSINOPHIL # BLD AUTO: 0.23 K/UL — SIGNIFICANT CHANGE UP (ref 0–0.5)
EOSINOPHIL NFR BLD AUTO: 0.9 % — SIGNIFICANT CHANGE UP (ref 0–6)
GLUCOSE SERPL-MCNC: 130 MG/DL — HIGH (ref 70–99)
GLUCOSE SERPL-MCNC: 97 MG/DL — SIGNIFICANT CHANGE UP (ref 70–99)
HCT VFR BLD CALC: 31.3 % — LOW (ref 39–50)
HCT VFR BLD CALC: 35.8 % — LOW (ref 39–50)
HCV AB S/CO SERPL IA: 0.06 S/CO — SIGNIFICANT CHANGE UP (ref 0–0.99)
HCV AB SERPL-IMP: SIGNIFICANT CHANGE UP
HGB BLD-MCNC: 10.5 G/DL — LOW (ref 13–17)
HGB BLD-MCNC: 12 G/DL — LOW (ref 13–17)
IANC: 22.99 K/UL — HIGH (ref 1.5–8.5)
INR BLD: 1.4 RATIO — HIGH (ref 0.88–1.16)
LYMPHOCYTES # BLD AUTO: 0.91 K/UL — LOW (ref 1–3.3)
LYMPHOCYTES # BLD AUTO: 3.5 % — LOW (ref 13–44)
MAGNESIUM SERPL-MCNC: 2 MG/DL — SIGNIFICANT CHANGE UP (ref 1.6–2.6)
MCHC RBC-ENTMCNC: 29.9 PG — SIGNIFICANT CHANGE UP (ref 27–34)
MCHC RBC-ENTMCNC: 30.5 PG — SIGNIFICANT CHANGE UP (ref 27–34)
MCHC RBC-ENTMCNC: 33.5 GM/DL — SIGNIFICANT CHANGE UP (ref 32–36)
MCHC RBC-ENTMCNC: 33.5 GM/DL — SIGNIFICANT CHANGE UP (ref 32–36)
MCV RBC AUTO: 89.1 FL — SIGNIFICANT CHANGE UP (ref 80–100)
MCV RBC AUTO: 91 FL — SIGNIFICANT CHANGE UP (ref 80–100)
MONOCYTES # BLD AUTO: 1.12 K/UL — HIGH (ref 0–0.9)
MONOCYTES NFR BLD AUTO: 4.3 % — SIGNIFICANT CHANGE UP (ref 2–14)
NEUTROPHILS # BLD AUTO: 23.35 K/UL — HIGH (ref 1.8–7.4)
NEUTROPHILS NFR BLD AUTO: 88.7 % — HIGH (ref 43–77)
NRBC # BLD: 0 /100 WBCS — SIGNIFICANT CHANGE UP
NRBC # FLD: 0 K/UL — SIGNIFICANT CHANGE UP
PHOSPHATE SERPL-MCNC: 2.6 MG/DL — SIGNIFICANT CHANGE UP (ref 2.5–4.5)
PLATELET # BLD AUTO: 236 K/UL — SIGNIFICANT CHANGE UP (ref 150–400)
PLATELET # BLD AUTO: 260 K/UL — SIGNIFICANT CHANGE UP (ref 150–400)
POTASSIUM SERPL-MCNC: 3.8 MMOL/L — SIGNIFICANT CHANGE UP (ref 3.5–5.3)
POTASSIUM SERPL-MCNC: 4.1 MMOL/L — SIGNIFICANT CHANGE UP (ref 3.5–5.3)
POTASSIUM SERPL-SCNC: 3.8 MMOL/L — SIGNIFICANT CHANGE UP (ref 3.5–5.3)
POTASSIUM SERPL-SCNC: 4.1 MMOL/L — SIGNIFICANT CHANGE UP (ref 3.5–5.3)
PROT SERPL-MCNC: 6.6 G/DL — SIGNIFICANT CHANGE UP (ref 6–8.3)
PROTHROM AB SERPL-ACNC: 15.7 SEC — HIGH (ref 10.6–13.6)
RBC # BLD: 3.44 M/UL — LOW (ref 4.2–5.8)
RBC # BLD: 4.02 M/UL — LOW (ref 4.2–5.8)
RBC # FLD: 13 % — SIGNIFICANT CHANGE UP (ref 10.3–14.5)
RBC # FLD: 13.4 % — SIGNIFICANT CHANGE UP (ref 10.3–14.5)
RH IG SCN BLD-IMP: NEGATIVE — SIGNIFICANT CHANGE UP
SODIUM SERPL-SCNC: 136 MMOL/L — SIGNIFICANT CHANGE UP (ref 135–145)
SODIUM SERPL-SCNC: 137 MMOL/L — SIGNIFICANT CHANGE UP (ref 135–145)
WBC # BLD: 20.86 K/UL — HIGH (ref 3.8–10.5)
WBC # BLD: 26.06 K/UL — HIGH (ref 3.8–10.5)
WBC # FLD AUTO: 20.86 K/UL — HIGH (ref 3.8–10.5)
WBC # FLD AUTO: 26.06 K/UL — HIGH (ref 3.8–10.5)

## 2021-03-09 PROCEDURE — 88304 TISSUE EXAM BY PATHOLOGIST: CPT | Mod: 26

## 2021-03-09 PROCEDURE — 74177 CT ABD & PELVIS W/CONTRAST: CPT | Mod: 26

## 2021-03-09 RX ORDER — ONDANSETRON 8 MG/1
4 TABLET, FILM COATED ORAL ONCE
Refills: 0 | Status: DISCONTINUED | OUTPATIENT
Start: 2021-03-09 | End: 2021-03-09

## 2021-03-09 RX ORDER — OXYCODONE HYDROCHLORIDE 5 MG/1
5 TABLET ORAL ONCE
Refills: 0 | Status: DISCONTINUED | OUTPATIENT
Start: 2021-03-09 | End: 2021-03-09

## 2021-03-09 RX ORDER — ASPIRIN/CALCIUM CARB/MAGNESIUM 324 MG
81 TABLET ORAL DAILY
Refills: 0 | Status: DISCONTINUED | OUTPATIENT
Start: 2021-03-10 | End: 2021-03-11

## 2021-03-09 RX ORDER — POTASSIUM PHOSPHATE, MONOBASIC POTASSIUM PHOSPHATE, DIBASIC 236; 224 MG/ML; MG/ML
15 INJECTION, SOLUTION INTRAVENOUS ONCE
Refills: 0 | Status: COMPLETED | OUTPATIENT
Start: 2021-03-09 | End: 2021-03-10

## 2021-03-09 RX ORDER — ACETAMINOPHEN 500 MG
650 TABLET ORAL EVERY 6 HOURS
Refills: 0 | Status: DISCONTINUED | OUTPATIENT
Start: 2021-03-09 | End: 2021-03-11

## 2021-03-09 RX ORDER — OXYCODONE HYDROCHLORIDE 5 MG/1
5 TABLET ORAL EVERY 6 HOURS
Refills: 0 | Status: DISCONTINUED | OUTPATIENT
Start: 2021-03-09 | End: 2021-03-11

## 2021-03-09 RX ORDER — FENTANYL CITRATE 50 UG/ML
25 INJECTION INTRAVENOUS
Refills: 0 | Status: DISCONTINUED | OUTPATIENT
Start: 2021-03-09 | End: 2021-03-09

## 2021-03-09 RX ORDER — ENOXAPARIN SODIUM 100 MG/ML
40 INJECTION SUBCUTANEOUS DAILY
Refills: 0 | Status: DISCONTINUED | OUTPATIENT
Start: 2021-03-10 | End: 2021-03-11

## 2021-03-09 RX ORDER — OXYCODONE HYDROCHLORIDE 5 MG/1
10 TABLET ORAL EVERY 6 HOURS
Refills: 0 | Status: DISCONTINUED | OUTPATIENT
Start: 2021-03-09 | End: 2021-03-11

## 2021-03-09 RX ADMIN — SODIUM CHLORIDE 75 MILLILITER(S): 9 INJECTION, SOLUTION INTRAVENOUS at 13:46

## 2021-03-09 RX ADMIN — Medication 975 MILLIGRAM(S): at 12:15

## 2021-03-09 RX ADMIN — OXYCODONE HYDROCHLORIDE 5 MILLIGRAM(S): 5 TABLET ORAL at 06:45

## 2021-03-09 RX ADMIN — PIPERACILLIN AND TAZOBACTAM 25 GRAM(S): 4; .5 INJECTION, POWDER, LYOPHILIZED, FOR SOLUTION INTRAVENOUS at 13:46

## 2021-03-09 RX ADMIN — SIMVASTATIN 20 MILLIGRAM(S): 20 TABLET, FILM COATED ORAL at 22:15

## 2021-03-09 RX ADMIN — Medication 975 MILLIGRAM(S): at 06:45

## 2021-03-09 RX ADMIN — PIPERACILLIN AND TAZOBACTAM 25 GRAM(S): 4; .5 INJECTION, POWDER, LYOPHILIZED, FOR SOLUTION INTRAVENOUS at 19:17

## 2021-03-09 RX ADMIN — OXYCODONE HYDROCHLORIDE 5 MILLIGRAM(S): 5 TABLET ORAL at 04:29

## 2021-03-09 RX ADMIN — OXYCODONE HYDROCHLORIDE 5 MILLIGRAM(S): 5 TABLET ORAL at 19:25

## 2021-03-09 RX ADMIN — OXYCODONE HYDROCHLORIDE 5 MILLIGRAM(S): 5 TABLET ORAL at 19:42

## 2021-03-09 RX ADMIN — Medication 975 MILLIGRAM(S): at 03:01

## 2021-03-09 RX ADMIN — PANTOPRAZOLE SODIUM 40 MILLIGRAM(S): 20 TABLET, DELAYED RELEASE ORAL at 05:01

## 2021-03-09 RX ADMIN — PIPERACILLIN AND TAZOBACTAM 25 GRAM(S): 4; .5 INJECTION, POWDER, LYOPHILIZED, FOR SOLUTION INTRAVENOUS at 04:29

## 2021-03-09 RX ADMIN — Medication 975 MILLIGRAM(S): at 05:01

## 2021-03-09 RX ADMIN — ENOXAPARIN SODIUM 40 MILLIGRAM(S): 100 INJECTION SUBCUTANEOUS at 12:38

## 2021-03-09 NOTE — PROGRESS NOTE ADULT - SUBJECTIVE AND OBJECTIVE BOX
GENERAL SURGERY DAILY PROGRESS NOTE:       Subjective: Patient examined at bedside. No acute events overnight. Continues to have some RUQ pain. Continues w/ NPO for planned OR today for lap octavio.       Objective:    Vital Signs Last 24 Hrs  T(C): 37.1 (09 Mar 2021 07:54), Max: 37.6 (08 Mar 2021 17:03)  T(F): 98.7 (09 Mar 2021 07:54), Max: 99.6 (08 Mar 2021 17:03)  HR: 109 (09 Mar 2021 07:54) (96 - 109)  BP: 127/67 (09 Mar 2021 07:54) (121/68 - 150/80)  BP(mean): --  RR: 17 (09 Mar 2021 07:54) (16 - 20)  SpO2: 95% (09 Mar 2021 07:54) (94% - 100%)    I&O's Detail      Physical Exam:     General: NAD, resting comfortably in some pain   HEENT: NC/AT, EOMI, normal hearing  Pulmonary: normal resp effort  Cardiovascular: NSR  Abdominal: soft, tender to palpation in epigastric region  Extremities: no edema  Neuro: A/O x 3, no focal deficits        Labaratory Results:                          13.1   21.92 )-----------( 279      ( 08 Mar 2021 09:12 )             40.3     03-08    134<L>  |  98  |  16  ----------------------------<  134<H>  4.0   |  26  |  1.11    Ca    9.6      08 Mar 2021 09:12    TPro  7.2  /  Alb  3.8  /  TBili  0.8  /  DBili  x   /  AST  19  /  ALT  18  /  AlkPhos  63  03-08          Radiology and Additional Tests:    Medications:    MEDICATIONS  (STANDING):  acetaminophen   Tablet .. 975 milliGRAM(s) Oral every 6 hours  aspirin enteric coated 81 milliGRAM(s) Oral daily  enoxaparin Injectable 40 milliGRAM(s) SubCutaneous daily  lactated ringers. 1000 milliLiter(s) (75 mL/Hr) IV Continuous <Continuous>  pantoprazole    Tablet 40 milliGRAM(s) Oral before breakfast  piperacillin/tazobactam IVPB.. 3.375 Gram(s) IV Intermittent every 8 hours  simvastatin 20 milliGRAM(s) Oral at bedtime    MEDICATIONS  (PRN):

## 2021-03-09 NOTE — BRIEF OPERATIVE NOTE - OPERATION/FINDINGS
Gangrenous cholecystitis with contained posterior perforation.   Complete laparoscopic cholecystectomy performed. JOEY drain left in place across the gallbladder fossa.  Critical view of safety obtained and cystic duct and artery ligated with 5mm clips.

## 2021-03-09 NOTE — PROGRESS NOTE ADULT - ASSESSMENT
72 y/o male with history of HLD, Inguinal hernia repair, Appendectomy, and GERD presents with 3 days of abdominal n/w exam and labs concerning for acute cholecystitis. His RUQ U/S shows gallbladder wall thickening and pericholecystic fluid.    --OR today lap octavio, added on  -- NPO/IVF  --Urgent CT A/P IV contrast prior to OR    [id number]

## 2021-03-09 NOTE — PROVIDER CONTACT NOTE (CRITICAL VALUE NOTIFICATION) - SITUATION
RN called for critical lab. ABG lactate= 4.7. Unable to reach day shift ACP PA. Attempted to page and text page without success.

## 2021-03-09 NOTE — BRIEF OPERATIVE NOTE - NSICDXBRIEFPROCEDURE_GEN_ALL_CORE_FT
PROCEDURES:  Cholecystectomy, laparoscopic, without intraoperative cholangiogram 09-Mar-2021 18:16:50  Philip Hansen

## 2021-03-09 NOTE — CHART NOTE - NSCHARTNOTEFT_GEN_A_CORE
Post Operative Note  Patient: DUARTE VALENCIA 71y (1950) Male   MRN: [id number]  Location: [place] [address] A  Visit: 03-08-21 Inpatient  Date: 03-09-21 @ 22:38    Procedure: S/P lap cholecystectomy    Subjective: Patient reports pain well controlled. Patient was able to void recently. Denies passing gas or having BM. Denies N/V, fever, chills.      Objective:  Vitals: T(F): 97.5 (03-09-21 @ 22:09), Max: 99 (03-08-21 @ 23:54)  HR: 98 (03-09-21 @ 22:09)  BP: 139/64 (03-09-21 @ 22:09) (91/50 - 150/80)  RR: 17 (03-09-21 @ 22:09)  SpO2: 99% (03-09-21 @ 22:09)  Vent Settings:     In:   03-09-21 @ 07:01  -  03-09-21 @ 22:38  --------------------------------------------------------  IN: 400 mL      IV Fluids: lactated ringers. 1000 milliLiter(s) (100 mL/Hr) IV Continuous <Continuous>      Out:   03-09-21 @ 07:01  -  03-09-21 @ 22:38  --------------------------------------------------------  OUT: 20 mL      EBL:     Voided Urine:   03-09-21 @ 07:01  -  03-09-21 @ 22:38  --------------------------------------------------------  OUT: 20 mL      Feliz Catheter: no   Drains:   JOEY:   03-09-21 @ 07:01  -  03-09-21 @ 22:38  --------------------------------------------------------  OUT: 20 mL             Physical Examination:  General: NAD, resting comfortably in bed  HEENT: Normocephalic atraumatic  Respiratory: Nonlabored respirations, normal CW expansion.  Cardio: S1S2, regular rate and rhythm.  Abdomen: softly distended, appropriately tender, surgical incisions are c/d/i. Drain with SS output. Drain dressing c/d/i.   Vascular: extremities are warm and well perfused.     Imaging:  No post-op imaging studies    Assessment:  71yMale patient several hours s/p lap cholecystectomy.    Plan:  - IV Abx: zosyn  - Pain control PRN  - Diet: CLD, IVF advance tomorrow  - Resume home meds  - Activity: OOBAT  - DVT ppx: Lovenox    A team surgery [id number]        Date/Time: 03-09-21 @ 22:38 Post Operative Note  Patient: DUARTE VALENCIA 71y (1950) Male   MRN: [id number]  Location: [place] [address] A  Visit: 03-08-21 Inpatient  Date: 03-09-21 @ 22:38    Procedure: S/P lap cholecystectomy    Subjective: Patient reports pain well controlled. Patient was able to void recently. Denies passing gas or having BM. Denies N/V, fever, chills.      Objective:  Vitals: T(F): 97.5 (03-09-21 @ 22:09), Max: 99 (03-08-21 @ 23:54)  HR: 98 (03-09-21 @ 22:09)  BP: 139/64 (03-09-21 @ 22:09) (91/50 - 150/80)  RR: 17 (03-09-21 @ 22:09)  SpO2: 99% (03-09-21 @ 22:09)  Vent Settings:     In:   03-09-21 @ 07:01  -  03-09-21 @ 22:38  --------------------------------------------------------  IN: 400 mL      IV Fluids: lactated ringers. 1000 milliLiter(s) (100 mL/Hr) IV Continuous <Continuous>      Out:   03-09-21 @ 07:01  -  03-09-21 @ 22:38  --------------------------------------------------------  OUT: 20 mL      EBL:     Voided Urine:   03-09-21 @ 07:01  -  03-09-21 @ 22:38  --------------------------------------------------------  OUT: 20 mL      Feliz Catheter: no   Drains:   JOEY:   03-09-21 @ 07:01  -  03-09-21 @ 22:38  --------------------------------------------------------  OUT: 20 mL             Physical Examination:  General: NAD, resting comfortably in bed  HEENT: Normocephalic atraumatic  Respiratory: Nonlabored respirations, normal CW expansion.  Cardio: S1S2, regular rate and rhythm.  Abdomen: softly distended, appropriately tender, surgical incisions are c/d/i. Drain with SS output. Drain dressing c/d/i.   Vascular: extremities are warm and well perfused.     Imaging:  No post-op imaging studies    Assessment:  71yMale patient several hours s/p lap cholecystectomy.    Plan:  - IV Abx: zosyn  - Pain control PRN  - Diet: CLD, IVF advance tomorrow  - Resume home meds  - Activity: OOBAT  -AM labs  - DVT ppx: Lovenox    A team surgery [id number]        Date/Time: 03-09-21 @ 22:38

## 2021-03-09 NOTE — PROVIDER CONTACT NOTE (CRITICAL VALUE NOTIFICATION) - ASSESSMENT
Patient A&Ox4, VS stable. No complains of pain. Reports dizziness when walking restroom this morning. RN called for critical lab. ABG lactate= 4.7.

## 2021-03-10 ENCOUNTER — TRANSCRIPTION ENCOUNTER (OUTPATIENT)
Age: 71
End: 2021-03-10

## 2021-03-10 DIAGNOSIS — Z29.9 ENCOUNTER FOR PROPHYLACTIC MEASURES, UNSPECIFIED: ICD-10-CM

## 2021-03-10 DIAGNOSIS — I10 ESSENTIAL (PRIMARY) HYPERTENSION: ICD-10-CM

## 2021-03-10 DIAGNOSIS — K21.9 GASTRO-ESOPHAGEAL REFLUX DISEASE WITHOUT ESOPHAGITIS: ICD-10-CM

## 2021-03-10 DIAGNOSIS — E78.5 HYPERLIPIDEMIA, UNSPECIFIED: ICD-10-CM

## 2021-03-10 DIAGNOSIS — K81.0 ACUTE CHOLECYSTITIS: ICD-10-CM

## 2021-03-10 LAB
ALBUMIN SERPL ELPH-MCNC: 3.1 G/DL — LOW (ref 3.3–5)
ALP SERPL-CCNC: 74 U/L — SIGNIFICANT CHANGE UP (ref 40–120)
ALT FLD-CCNC: 39 U/L — SIGNIFICANT CHANGE UP (ref 4–41)
ANION GAP SERPL CALC-SCNC: 10 MMOL/L — SIGNIFICANT CHANGE UP (ref 7–14)
AST SERPL-CCNC: 38 U/L — SIGNIFICANT CHANGE UP (ref 4–40)
BILIRUB SERPL-MCNC: 0.4 MG/DL — SIGNIFICANT CHANGE UP (ref 0.2–1.2)
BUN SERPL-MCNC: 18 MG/DL — SIGNIFICANT CHANGE UP (ref 7–23)
CALCIUM SERPL-MCNC: 8.6 MG/DL — SIGNIFICANT CHANGE UP (ref 8.4–10.5)
CHLORIDE SERPL-SCNC: 101 MMOL/L — SIGNIFICANT CHANGE UP (ref 98–107)
CO2 SERPL-SCNC: 26 MMOL/L — SIGNIFICANT CHANGE UP (ref 22–31)
CREAT SERPL-MCNC: 1.17 MG/DL — SIGNIFICANT CHANGE UP (ref 0.5–1.3)
GLUCOSE SERPL-MCNC: 161 MG/DL — HIGH (ref 70–99)
HCT VFR BLD CALC: 29.3 % — LOW (ref 39–50)
HGB BLD-MCNC: 9.3 G/DL — LOW (ref 13–17)
MAGNESIUM SERPL-MCNC: 2.3 MG/DL — SIGNIFICANT CHANGE UP (ref 1.6–2.6)
MCHC RBC-ENTMCNC: 29.4 PG — SIGNIFICANT CHANGE UP (ref 27–34)
MCHC RBC-ENTMCNC: 31.7 GM/DL — LOW (ref 32–36)
MCV RBC AUTO: 92.7 FL — SIGNIFICANT CHANGE UP (ref 80–100)
NRBC # BLD: 0 /100 WBCS — SIGNIFICANT CHANGE UP
NRBC # FLD: 0 K/UL — SIGNIFICANT CHANGE UP
PHOSPHATE SERPL-MCNC: 3.5 MG/DL — SIGNIFICANT CHANGE UP (ref 2.5–4.5)
PLATELET # BLD AUTO: 248 K/UL — SIGNIFICANT CHANGE UP (ref 150–400)
POTASSIUM SERPL-MCNC: 4.6 MMOL/L — SIGNIFICANT CHANGE UP (ref 3.5–5.3)
POTASSIUM SERPL-SCNC: 4.6 MMOL/L — SIGNIFICANT CHANGE UP (ref 3.5–5.3)
PROT SERPL-MCNC: 6 G/DL — SIGNIFICANT CHANGE UP (ref 6–8.3)
RBC # BLD: 3.16 M/UL — LOW (ref 4.2–5.8)
RBC # FLD: 13.1 % — SIGNIFICANT CHANGE UP (ref 10.3–14.5)
SODIUM SERPL-SCNC: 137 MMOL/L — SIGNIFICANT CHANGE UP (ref 135–145)
WBC # BLD: 15.66 K/UL — HIGH (ref 3.8–10.5)
WBC # FLD AUTO: 15.66 K/UL — HIGH (ref 3.8–10.5)

## 2021-03-10 RX ADMIN — ENOXAPARIN SODIUM 40 MILLIGRAM(S): 100 INJECTION SUBCUTANEOUS at 13:07

## 2021-03-10 RX ADMIN — POTASSIUM PHOSPHATE, MONOBASIC POTASSIUM PHOSPHATE, DIBASIC 62.5 MILLIMOLE(S): 236; 224 INJECTION, SOLUTION INTRAVENOUS at 02:03

## 2021-03-10 RX ADMIN — PIPERACILLIN AND TAZOBACTAM 25 GRAM(S): 4; .5 INJECTION, POWDER, LYOPHILIZED, FOR SOLUTION INTRAVENOUS at 21:15

## 2021-03-10 RX ADMIN — PIPERACILLIN AND TAZOBACTAM 25 GRAM(S): 4; .5 INJECTION, POWDER, LYOPHILIZED, FOR SOLUTION INTRAVENOUS at 06:18

## 2021-03-10 RX ADMIN — OXYCODONE HYDROCHLORIDE 10 MILLIGRAM(S): 5 TABLET ORAL at 07:24

## 2021-03-10 RX ADMIN — PANTOPRAZOLE SODIUM 40 MILLIGRAM(S): 20 TABLET, DELAYED RELEASE ORAL at 06:18

## 2021-03-10 RX ADMIN — Medication 81 MILLIGRAM(S): at 13:07

## 2021-03-10 RX ADMIN — OXYCODONE HYDROCHLORIDE 10 MILLIGRAM(S): 5 TABLET ORAL at 00:57

## 2021-03-10 RX ADMIN — Medication 650 MILLIGRAM(S): at 21:11

## 2021-03-10 RX ADMIN — SIMVASTATIN 20 MILLIGRAM(S): 20 TABLET, FILM COATED ORAL at 21:12

## 2021-03-10 RX ADMIN — PIPERACILLIN AND TAZOBACTAM 25 GRAM(S): 4; .5 INJECTION, POWDER, LYOPHILIZED, FOR SOLUTION INTRAVENOUS at 13:07

## 2021-03-10 RX ADMIN — Medication 650 MILLIGRAM(S): at 01:58

## 2021-03-10 NOTE — CONSULT NOTE ADULT - PROBLEM SELECTOR RECOMMENDATION 9
S/P Lap octavio   orders as per surgery  pain control  feeing as tolearted  monitor for fever  cont zosyn for now   outpatient follow up

## 2021-03-10 NOTE — CONSULT NOTE ADULT - ASSESSMENT
ASSESSMENT:  70 y/o male with history of HLD, Inguinal hernia repair, Appendectomy, and GERD presents with 3 days of abdominal n/w exam and labs concerning for acute cholecystitis. His RUQ U/S is pending.     Plan:    - To be discussed with Dr. Jarret Peres
Patient is a 71 year old male with a PMHx of HLD, inguinal hernia repair, appendectomy and GERD who presented with 3 days of abdominal pain.  CT Abdomen / Pelvis performed showing acute cholecystitis.  No intra or extrahepatic biliary ductal dilatation.  Trace right pleural effusion.  Trace perihepatic free fluid adjacent to the right hepatic lobe is likely reactive and related to acute cholecystitis.  Patient is now S/P laparoscopic cholecystectomy on 3/9/21.

## 2021-03-10 NOTE — PROGRESS NOTE ADULT - SUBJECTIVE AND OBJECTIVE BOX
A Team Surgery Daily Progress Note  =====================================================    SUBJECTIVE: Patient seen and examined at bedside on AM rounds. Patient reports that they're feeling well. Tolerating diet, denies nausea, vomiting. OOB/Ambulating as tolerated. Denies fever, chills.     24 HOUR EVENTS: s/p lap octavio, PACU labs 12/35.8->10.5/31.3    MEDICATIONS  (STANDING):  aspirin enteric coated 81 milliGRAM(s) Oral daily  enoxaparin Injectable 40 milliGRAM(s) SubCutaneous daily  lactated ringers. 1000 milliLiter(s) (100 mL/Hr) IV Continuous <Continuous>  pantoprazole    Tablet 40 milliGRAM(s) Oral before breakfast  piperacillin/tazobactam IVPB.. 3.375 Gram(s) IV Intermittent every 8 hours  simvastatin 20 milliGRAM(s) Oral at bedtime    MEDICATIONS  (PRN):  acetaminophen   Tablet .. 650 milliGRAM(s) Oral every 6 hours PRN Mild Pain (1 - 3)  oxyCODONE    IR 5 milliGRAM(s) Oral every 6 hours PRN Moderate Pain (4 - 6)  oxyCODONE    IR 10 milliGRAM(s) Oral every 6 hours PRN Severe Pain (7 - 10)      OBJECTIVE:    Vital Signs Last 24 Hrs  T(C): 36.6 (10 Mar 2021 01:53), Max: 37.2 (09 Mar 2021 07:51)  T(F): 97.8 (10 Mar 2021 01:53), Max: 98.9 (09 Mar 2021 07:51)  HR: 97 (10 Mar 2021 01:53) (89 - 110)  BP: 111/66 (10 Mar 2021 01:53) (91/50 - 150/80)  BP(mean): 73 (09 Mar 2021 20:00) (54 - 82)  RR: 16 (10 Mar 2021 01:53) (13 - 21)  SpO2: 96% (10 Mar 2021 01:53) (93% - 99%)    PHYSICAL EXAM:  General: NAD, resting comfortably in bed  HEENT: Normocephalic atraumatic  Respiratory: Nonlabored respirations, normal CW expansion.  Cardio: S1S2, regular rate and rhythm.  Abdomen: softly distended, appropriately tender, surgical incisions are c/d/i. Drain with SS output. Drain dressing c/d/i.   Vascular: extremities are warm and well perfused.         I&O's Detail    09 Mar 2021 07:01  -  10 Mar 2021 03:41  --------------------------------------------------------  IN:    Lactated Ringers: 100 mL    Oral Fluid: 300 mL  Total IN: 400 mL    OUT:    Bulb (mL): 30 mL    Voided (mL): 300 mL  Total OUT: 330 mL    Total NET: 70 mL          Daily Height in cm: 172.72 (09 Mar 2021 13:59)    Daily Weight in k.7 (09 Mar 2021 07:54)    LABS:                        10.5   20.86 )-----------( 236      ( 09 Mar 2021 18:31 )             31.3     03-09    136  |  100  |  20  ----------------------------<  130<H>  4.1   |  22  |  1.15    Ca    8.6      09 Mar 2021 18:31  Phos  2.6     03-09  Mg     2.0     03-09    TPro  6.6  /  Alb  3.2<L>  /  TBili  0.9  /  DBili  x   /  AST  23  /  ALT  24  /  AlkPhos  87  03-09    PT/INR - ( 09 Mar 2021 11:24 )   PT: 15.7 sec;   INR: 1.40 ratio         PTT - ( 09 Mar 2021 11:24 )  PTT:29.3 sec                     Surgery Daily Progress Note  =====================================================  Interval / Overnight Events: No acute events overnight.      HPI:  Patient is a 71 year old male with a PMHx of HLD, inguinal hernia repair, appendectomy and GERD who presented with 3 days of abdominal pain. (08 Mar 2021 16:01)      PAST MEDICAL & SURGICAL HISTORY:  GERD (gastroesophageal reflux disease)  HLD (hyperlipidemia)  S/P inguinal hernia repair  S/P appendectomy      ALLERGIES:  No Known Allergies    --------------------------------------------------------------------------------------    MEDICATIONS:    Neurologic Medications  acetaminophen   Tablet .. 650 milliGRAM(s) Oral every 6 hours PRN Mild Pain (1 - 3)  oxyCODONE    IR 5 milliGRAM(s) Oral every 6 hours PRN Moderate Pain (4 - 6)  oxyCODONE    IR 10 milliGRAM(s) Oral every 6 hours PRN Severe Pain (7 - 10)    Gastrointestinal Medications  pantoprazole    Tablet 40 milliGRAM(s) Oral before breakfast    Hematologic/Oncologic Medications  aspirin enteric coated 81 milliGRAM(s) Oral daily  enoxaparin Injectable 40 milliGRAM(s) SubCutaneous daily    Antimicrobial/Immunologic Medications  piperacillin/tazobactam IVPB.. 3.375 Gram(s) IV Intermittent every 8 hours    Endocrine/Metabolic Medications  simvastatin 20 milliGRAM(s) Oral at bedtime    --------------------------------------------------------------------------------------    VITAL SIGNS:  T(C): 37 (10 Mar 2021 06:14), Max: 37 (09 Mar 2021 17:55)  T(F): 98.6 (10 Mar 2021 06:14), Max: 98.6 (09 Mar 2021 17:55)  HR: 85 (10 Mar 2021 06:14) (85 - 110)  BP: 106/63 (10 Mar 2021 06:14) (91/50 - 139/64)  BP(mean): 73 (09 Mar 2021 20:00) (54 - 82)  RR: 17 (10 Mar 2021 06:14) (13 - 21)  SpO2: 95% (10 Mar 2021 06:14) (93% - 99%)    --------------------------------------------------------------------------------------    INS AND OUTS:    09 Mar 2021 07:01  -  10 Mar 2021 07:00  --------------------------------------------------------  IN:    Lactated Ringers: 100 mL    Oral Fluid: 300 mL  Total IN: 400 mL    OUT:    Bulb (mL): 42.5 mL    Voided (mL): 625 mL  Total OUT: 667.5 mL    Total NET: -267.5 mL    --------------------------------------------------------------------------------------    EXAM    NEUROLOGY  Exam: Normal, in no acute distress.    HEENT  Exam: Normocephalic, atraumatic.    RESPIRATORY  Exam: Normal expansion / effort.    CARDIOVASCULAR  Exam: S1, S2.  Regular rate and rhythm.    GI/NUTRITION  Exam: Abdomen soft, Non-tender, Non-distended.  Port sites clean, dry and intact.  JOEY drain serosanguinous.  Current Diet: Regular diet    MUSCULOSKELETAL  Exam: All extremities moving spontaneously without limitations.      HEMATOLOGIC  [x] VTE Prophylaxis: aspirin enteric coated 81 milliGRAM(s) Oral daily  enoxaparin Injectable 40 milliGRAM(s) SubCutaneous daily      INFECTIOUS DISEASE  Antimicrobials/Immunologic Medications:  piperacillin/tazobactam IVPB.. 3.375 Gram(s) IV Intermittent every 8 hours    --------------------------------------------------------------------------------------

## 2021-03-10 NOTE — DISCHARGE NOTE PROVIDER - NSDCMRMEDTOKEN_GEN_ALL_CORE_FT
aspirin 81 mg oral tablet: 1 tab(s) orally once a day  omeprazole 40 mg oral delayed release capsule: 1 cap(s) orally once a day  simvastatin 20 mg oral tablet: 1 tab(s) orally once a day (at bedtime)   acetaminophen 325 mg oral tablet: 2 tab(s) orally every 6 hours, As needed, Mild Pain (1 - 3)  aspirin 81 mg oral tablet: 1 tab(s) orally once a day  omeprazole 40 mg oral delayed release capsule: 1 cap(s) orally once a day  oxyCODONE 5 mg oral tablet: 1 tab(s) orally every 6 hours, As Needed -Moderate Pain (4 - 6) MDD:4 tabs  simvastatin 20 mg oral tablet: 1 tab(s) orally once a day (at bedtime)   acetaminophen 325 mg oral tablet: 2 tab(s) orally every 6 hours, As needed, Mild Pain (1 - 3)  aspirin 81 mg oral tablet: 1 tab(s) orally once a day  Augmentin 875 mg-125 mg oral tablet: 1 tab(s) orally every 12 hours   omeprazole 40 mg oral delayed release capsule: 1 cap(s) orally once a day  oxyCODONE 5 mg oral tablet: 1 tab(s) orally every 6 hours, As Needed -Moderate Pain (4 - 6) MDD:4 tabs  simvastatin 20 mg oral tablet: 1 tab(s) orally once a day (at bedtime)

## 2021-03-10 NOTE — PROGRESS NOTE ADULT - ASSESSMENT
Assessment and Plan:  71yMale patient POD1 s/p lap cholecystectomy, recovering well on floors.    Plan:  - IV Abx: zosyn  - Pain control PRN  - Diet: CLD, IVF advance tomorrow  - Resume home meds  - Activity: OOBAT  -AM labs  - DVT ppx: Lovenox  -DC planning      A team surgery [id number]   Patient is a 71 year old male with a PMHx of HLD, inguinal hernia repair, appendectomy and GERD who presented with 3 days of abdominal pain.  CT Abdomen / Pelvis performed showing acute cholecystitis.  No intra or extrahepatic biliary ductal dilatation.  Trace right pleural effusion.  Trace perihepatic free fluid adjacent to the right hepatic lobe is likely reactive and related to acute cholecystitis.  Patient is now S/P laparoscopic cholecystectomy on 3/9/21.      Plan:  - Advance to regular diet  - IV lock  - Continue with IV Zosyn  - Out of bed  - Pain control  - VTE prophylaxis with Lovenox subcutaneous  - Discharge planning      #[id number]  A Team Surgery

## 2021-03-10 NOTE — DISCHARGE NOTE PROVIDER - HOSPITAL COURSE
Patient is a 71 year old male with a PMHx of HLD, inguinal hernia repair, appendectomy and GERD who presented with 3 days of abdominal pain.    On 3/9 CT Abdomen / Pelvis performed showing acute cholecystitis.  No intra or extrahepatic biliary ductal dilatation.  Trace right pleural effusion.  Trace perihepatic free fluid adjacent to the right hepatic lobe is likely reactive and related to acute cholecystitis.  Patient was made NPO with IV fluids.  He was started on IV Zosyn.  He was taken to the OR for a laparoscopic cholecystectomy.  Post operatively, patient was advanced to a clear liquid diet, which he tolerated well.    On 3/10 patient was advanced to a regular lowfat diet, which he tolerated well.    ***COMPLETE UP TO 3/10*** Patient is a 71 year old male with a PMHx of HLD, inguinal hernia repair, appendectomy and GERD who presented with 3 days of abdominal pain.    On 3/9 CT Abdomen / Pelvis performed showing acute cholecystitis.  No intra or extrahepatic biliary ductal dilatation.  Trace right pleural effusion.  Trace perihepatic free fluid adjacent to the right hepatic lobe is likely reactive and related to acute cholecystitis.  Patient was made NPO with IV fluids.  He was started on IV Zosyn.  He was taken to the OR for a laparoscopic cholecystectomy.  Post operatively, patient was advanced to a clear liquid diet, which he tolerated well.    On 3/10 patient was advanced to a regular lowfat diet, which he tolerated well.    Once he was tolerating regular diet, pain was well-controlled, and his was voiding and ambulating without difficulty, patient was found to be stable for discharge to home. He will follow-up in the office with Dr. Peres for post-op appointment.

## 2021-03-10 NOTE — DISCHARGE NOTE PROVIDER - NSDCFUADDINST_GEN_ALL_CORE_FT
WOUND CARE:  Please keep incisions clean and dry. Please do not Scrub or rub incisions. Do not use lotion or powder on incisions.   BATHING: You may shower and/or sponge bathe. You may use warm soapy water in the shower and rinse, pat dry.  ACTIVITY: No heavy lifting or straining. Otherwise, you may return to your usual level of physical activity. If you are taking narcotic pain medication DO NOT drive a car, operate machinery or make important decisions.  DIET: Return to your usual diet.  NOTIFY YOUR SURGEON IF YOU HAVE: any bleeding that does not stop, any pus draining from your wound(s), any fever (over 100.4 F) persistent nausea/vomiting, or if your pain is not controlled on your discharge pain medications, unable to urinate.  Please follow up with your primary care physician in one week regarding your hospitalization, bring copies of your discharge paperwork.  Please follow up with your surgeon, Dr. Peres as an outpatient, please call to schedule appointment

## 2021-03-10 NOTE — DISCHARGE NOTE PROVIDER - CARE PROVIDER_API CALL
Jarret Peres)  Surgery  [address] [facility] [address] [place], [place], [address]  [place] [address]  Phone: [phone]  Fax: [phone]  Follow Up Time: 1 week

## 2021-03-10 NOTE — CONSULT NOTE ADULT - SUBJECTIVE AND OBJECTIVE BOX
General Surgery Consult Note  Attending: Dr. Jarret Peres  Service: A Team Surgery    HPI:  72 y/o male with history of HLD, Inguinal hernia repair, Appendectomy, and GERD presents with 3 days of abdominal. He first noticed this on Saturday and got progressively worse 8/10. He has never had this pain before and doesn't endorse any exacerbating or remitting factors. The pain radiates to his back. He has been taking PPI without relief. He denies fever, chills, dizziness, lightheadedness, headaches, N/V, CP, SOB, weakness, numbness. In the ED he had elevated leukocytosis to 21.92 and normal LFTs. He is pending RUQ U/S      PAST MEDICAL & SURGICAL HISTORY:  HLD (hyperlipidemia)        ALLERGIES:  Allergies    No Known Allergies    Intolerances        SOCIAL HISTORY:    FAMILY HISTORY:  FAMILY HISTORY:      PHYSICAL EXAM:  General: NAD, resting comfortably  HEENT: NC/AT, EOMI, normal hearing  Pulmonary: normal resp effort  Cardiovascular: NSR  Abdominal: soft, ND, tender to palpation in epigastric region, - kelley sign  Extremities: WWP, normal strength, no clubbing/cyanosis/edema  Neuro: A/O x 3, no focal deficits  Pulses: palpable distal pulses    VITAL SIGNS:  Vital Signs Last 24 Hrs  T(C): 36.8 (08 Mar 2021 08:35), Max: 36.8 (08 Mar 2021 08:35)  T(F): 98.3 (08 Mar 2021 08:35), Max: 98.3 (08 Mar 2021 08:35)  HR: 96 (08 Mar 2021 12:01) (96 - 100)  BP: 142/72 (08 Mar 2021 12:01) (140/90 - 148/92)  BP(mean): --  RR: 20 (08 Mar 2021 12:01) (20 - 22)  SpO2: 100% (08 Mar 2021 12:01) (96% - 100%)    I&O's Summary      LABS:                        13.1   21.92 )-----------( 279      ( 08 Mar 2021 09:12 )             40.3     03-08    134<L>  |  98  |  16  ----------------------------<  134<H>  4.0   |  26  |  1.11    Ca    9.6      08 Mar 2021 09:12    TPro  7.2  /  Alb  3.8  /  TBili  0.8  /  DBili  x   /  AST  19  /  ALT  18  /  AlkPhos  63  03-08        CAPILLARY BLOOD GLUCOSE        LIVER FUNCTIONS - ( 08 Mar 2021 09:12 )  Alb: 3.8 g/dL / Pro: 7.2 g/dL / ALK PHOS: 63 U/L / ALT: 18 U/L / AST: 19 U/L / GGT: x             CULTURES:      RADIOLOGY & ADDITIONAL STUDIES:      RUQ U/S pending      
Patient is a 72 y/o male with history of HLD, Inguinal hernia repair, Appendectomy, and GERD presents with 3 days of abdominal. He first noticed this on Saturday and got progressively worse 8/10. He has never had this pain before and doesn't endorse any exacerbating or remitting factors. The pain radiates to his back. He has been taking PPI without relief. He denies fever, chills, dizziness, lightheadedness, headaches, N/V, CP, SOB, weakness, numbness. In the ED he had elevated leukocytosis to 21.92 and normal LFTs. RUQ U/S with gallbladder wall thickening and pericholecystic fluid. S/P lap octavio , doing okay     REVIEW OF SYSTEMS:    CONSTITUTIONAL: No weakness, fevers or chills  EYES/ENT: No visual changes;  No vertigo or throat pain   NECK: No pain or stiffness  RESPIRATORY: No cough, wheezing, hemoptysis; No shortness of breath  CARDIOVASCULAR: No chest pain or palpitations  GASTROINTESTINAL: No abdominal or epigastric pain. No nausea, vomiting, or hematemesis; No diarrhea or constipation. No melena or hematochezia.  GENITOURINARY: No dysuria, frequency or hematuria  NEUROLOGICAL: No numbness or weakness  SKIN: No itching, burning, rashes, or lesions   All other review of systems is negative unless indicated above.    PAST MEDICAL & SURGICAL HISTORY:  GERD (gastroesophageal reflux disease)    HLD (hyperlipidemia)    S/P inguinal hernia repair    S/P appendectomy      MEDICATIONS  (STANDING):  aspirin enteric coated 81 milliGRAM(s) Oral daily  enoxaparin Injectable 40 milliGRAM(s) SubCutaneous daily  pantoprazole    Tablet 40 milliGRAM(s) Oral before breakfast  piperacillin/tazobactam IVPB.. 3.375 Gram(s) IV Intermittent every 8 hours  simvastatin 20 milliGRAM(s) Oral at bedtime    MEDICATIONS  (PRN):  acetaminophen   Tablet .. 650 milliGRAM(s) Oral every 6 hours PRN Mild Pain (1 - 3)  oxyCODONE    IR 5 milliGRAM(s) Oral every 6 hours PRN Moderate Pain (4 - 6)  oxyCODONE    IR 10 milliGRAM(s) Oral every 6 hours PRN Severe Pain (7 - 10)      Home Medications:  aspirin 81 mg oral tablet: 1 tab(s) orally once a day (08 Mar 2021 16:05)  omeprazole 40 mg oral delayed release capsule: 1 cap(s) orally once a day (08 Mar 2021 16:05)  simvastatin 20 mg oral tablet: 1 tab(s) orally once a day (at bedtime) (08 Mar 2021 16:05)      Allergies    No Known Allergies    Intolerances    Vital Signs Last 24 Hrs  T(C): 36.9 (10 Mar 2021 13:04), Max: 37 (09 Mar 2021 17:55)  T(F): 98.5 (10 Mar 2021 13:04), Max: 98.6 (09 Mar 2021 17:55)  HR: 99 (10 Mar 2021 13:04) (85 - 110)  BP: 104/48 (10 Mar 2021 13:04) (91/50 - 139/64)  BP(mean): 73 (09 Mar 2021 20:00) (54 - 82)  RR: 17 (10 Mar 2021 13:04) (13 - 21)  SpO2: 95% (10 Mar 2021 13:04) (93% - 99%)    Appearance: Normal	  HEENT:   Normal oral mucosa, PERRL, EOMI	  Lymphatic: No lymphadenopathy , no edema  Cardiovascular: Normal S1 S2, No JVD  Respiratory: Lungs clear to auscultation, normal effort 	  Gastrointestinal:  Soft, mild pain at surgical site, no bleeding   Skin: No rashes, No ecchymoses, No cyanosis, warm to touch  Musculoskeletal: Normal range of motion, normal strength  Psychiatry:  Mood & affect appropriate  Ext: No edema                          9.3    15.66 )-----------( 248      ( 10 Mar 2021 06:26 )             29.3               03-10    137  |  101  |  18  ----------------------------<  161<H>  4.6   |  26  |  1.17    Ca    8.6      10 Mar 2021 06:26  Phos  3.5     03-10  Mg     2.3     03-10    TPro  6.0  /  Alb  3.1<L>  /  TBili  0.4  /  DBili  x   /  AST  38  /  ALT  39  /  AlkPhos  74  03-10    PT/INR - ( 09 Mar 2021 11:24 )   PT: 15.7 sec;   INR: 1.40 ratio         PTT - ( 09 Mar 2021 11:24 )  PTT:29.3 sec

## 2021-03-11 ENCOUNTER — TRANSCRIPTION ENCOUNTER (OUTPATIENT)
Age: 71
End: 2021-03-11

## 2021-03-11 VITALS
RESPIRATION RATE: 17 BRPM | DIASTOLIC BLOOD PRESSURE: 64 MMHG | HEART RATE: 71 BPM | TEMPERATURE: 99 F | SYSTOLIC BLOOD PRESSURE: 114 MMHG | OXYGEN SATURATION: 98 %

## 2021-03-11 LAB
ALBUMIN SERPL ELPH-MCNC: 3.1 G/DL — LOW (ref 3.3–5)
ALP SERPL-CCNC: 68 U/L — SIGNIFICANT CHANGE UP (ref 40–120)
ALT FLD-CCNC: 46 U/L — HIGH (ref 4–41)
ANION GAP SERPL CALC-SCNC: 11 MMOL/L — SIGNIFICANT CHANGE UP (ref 7–14)
ANION GAP SERPL CALC-SCNC: 9 MMOL/L — SIGNIFICANT CHANGE UP (ref 7–14)
AST SERPL-CCNC: 40 U/L — SIGNIFICANT CHANGE UP (ref 4–40)
BILIRUB SERPL-MCNC: 0.2 MG/DL — SIGNIFICANT CHANGE UP (ref 0.2–1.2)
BUN SERPL-MCNC: 17 MG/DL — SIGNIFICANT CHANGE UP (ref 7–23)
BUN SERPL-MCNC: 18 MG/DL — SIGNIFICANT CHANGE UP (ref 7–23)
CALCIUM SERPL-MCNC: 8.4 MG/DL — SIGNIFICANT CHANGE UP (ref 8.4–10.5)
CALCIUM SERPL-MCNC: 8.5 MG/DL — SIGNIFICANT CHANGE UP (ref 8.4–10.5)
CHLORIDE SERPL-SCNC: 101 MMOL/L — SIGNIFICANT CHANGE UP (ref 98–107)
CHLORIDE SERPL-SCNC: 101 MMOL/L — SIGNIFICANT CHANGE UP (ref 98–107)
CO2 SERPL-SCNC: 26 MMOL/L — SIGNIFICANT CHANGE UP (ref 22–31)
CO2 SERPL-SCNC: 27 MMOL/L — SIGNIFICANT CHANGE UP (ref 22–31)
CREAT SERPL-MCNC: 1.18 MG/DL — SIGNIFICANT CHANGE UP (ref 0.5–1.3)
CREAT SERPL-MCNC: 1.18 MG/DL — SIGNIFICANT CHANGE UP (ref 0.5–1.3)
GLUCOSE SERPL-MCNC: 124 MG/DL — HIGH (ref 70–99)
GLUCOSE SERPL-MCNC: 127 MG/DL — HIGH (ref 70–99)
HCT VFR BLD CALC: 25.7 % — LOW (ref 39–50)
HCT VFR BLD CALC: 29.4 % — LOW (ref 39–50)
HGB BLD-MCNC: 8.6 G/DL — LOW (ref 13–17)
HGB BLD-MCNC: 9.2 G/DL — LOW (ref 13–17)
MAGNESIUM SERPL-MCNC: 2.3 MG/DL — SIGNIFICANT CHANGE UP (ref 1.6–2.6)
MAGNESIUM SERPL-MCNC: 2.4 MG/DL — SIGNIFICANT CHANGE UP (ref 1.6–2.6)
MCHC RBC-ENTMCNC: 29.3 PG — SIGNIFICANT CHANGE UP (ref 27–34)
MCHC RBC-ENTMCNC: 29.8 PG — SIGNIFICANT CHANGE UP (ref 27–34)
MCHC RBC-ENTMCNC: 31.3 GM/DL — LOW (ref 32–36)
MCHC RBC-ENTMCNC: 33.5 GM/DL — SIGNIFICANT CHANGE UP (ref 32–36)
MCV RBC AUTO: 88.9 FL — SIGNIFICANT CHANGE UP (ref 80–100)
MCV RBC AUTO: 93.6 FL — SIGNIFICANT CHANGE UP (ref 80–100)
NRBC # BLD: 0 /100 WBCS — SIGNIFICANT CHANGE UP
NRBC # BLD: 0 /100 WBCS — SIGNIFICANT CHANGE UP
NRBC # FLD: 0 K/UL — SIGNIFICANT CHANGE UP
NRBC # FLD: 0 K/UL — SIGNIFICANT CHANGE UP
PHOSPHATE SERPL-MCNC: 1.4 MG/DL — LOW (ref 2.5–4.5)
PHOSPHATE SERPL-MCNC: 2.5 MG/DL — SIGNIFICANT CHANGE UP (ref 2.5–4.5)
PLATELET # BLD AUTO: 275 K/UL — SIGNIFICANT CHANGE UP (ref 150–400)
PLATELET # BLD AUTO: 322 K/UL — SIGNIFICANT CHANGE UP (ref 150–400)
POTASSIUM SERPL-MCNC: 3.7 MMOL/L — SIGNIFICANT CHANGE UP (ref 3.5–5.3)
POTASSIUM SERPL-MCNC: 3.9 MMOL/L — SIGNIFICANT CHANGE UP (ref 3.5–5.3)
POTASSIUM SERPL-SCNC: 3.7 MMOL/L — SIGNIFICANT CHANGE UP (ref 3.5–5.3)
POTASSIUM SERPL-SCNC: 3.9 MMOL/L — SIGNIFICANT CHANGE UP (ref 3.5–5.3)
PROT SERPL-MCNC: 5.8 G/DL — LOW (ref 6–8.3)
RBC # BLD: 2.89 M/UL — LOW (ref 4.2–5.8)
RBC # BLD: 3.14 M/UL — LOW (ref 4.2–5.8)
RBC # FLD: 13.2 % — SIGNIFICANT CHANGE UP (ref 10.3–14.5)
RBC # FLD: 13.3 % — SIGNIFICANT CHANGE UP (ref 10.3–14.5)
SODIUM SERPL-SCNC: 136 MMOL/L — SIGNIFICANT CHANGE UP (ref 135–145)
SODIUM SERPL-SCNC: 139 MMOL/L — SIGNIFICANT CHANGE UP (ref 135–145)
WBC # BLD: 13.36 K/UL — HIGH (ref 3.8–10.5)
WBC # BLD: 14.77 K/UL — HIGH (ref 3.8–10.5)
WBC # FLD AUTO: 13.36 K/UL — HIGH (ref 3.8–10.5)
WBC # FLD AUTO: 14.77 K/UL — HIGH (ref 3.8–10.5)

## 2021-03-11 RX ORDER — ACETAMINOPHEN 500 MG
2 TABLET ORAL
Qty: 0 | Refills: 0 | DISCHARGE
Start: 2021-03-11

## 2021-03-11 RX ORDER — OXYCODONE HYDROCHLORIDE 5 MG/1
1 TABLET ORAL
Qty: 12 | Refills: 0
Start: 2021-03-11 | End: 2021-03-13

## 2021-03-11 RX ADMIN — Medication 81 MILLIGRAM(S): at 12:44

## 2021-03-11 RX ADMIN — PANTOPRAZOLE SODIUM 40 MILLIGRAM(S): 20 TABLET, DELAYED RELEASE ORAL at 06:03

## 2021-03-11 RX ADMIN — OXYCODONE HYDROCHLORIDE 5 MILLIGRAM(S): 5 TABLET ORAL at 01:08

## 2021-03-11 RX ADMIN — ENOXAPARIN SODIUM 40 MILLIGRAM(S): 100 INJECTION SUBCUTANEOUS at 12:44

## 2021-03-11 RX ADMIN — PIPERACILLIN AND TAZOBACTAM 25 GRAM(S): 4; .5 INJECTION, POWDER, LYOPHILIZED, FOR SOLUTION INTRAVENOUS at 12:45

## 2021-03-11 RX ADMIN — Medication 650 MILLIGRAM(S): at 06:07

## 2021-03-11 RX ADMIN — Medication 85 MILLIMOLE(S): at 10:36

## 2021-03-11 RX ADMIN — PIPERACILLIN AND TAZOBACTAM 25 GRAM(S): 4; .5 INJECTION, POWDER, LYOPHILIZED, FOR SOLUTION INTRAVENOUS at 06:02

## 2021-03-11 NOTE — DISCHARGE NOTE NURSING/CASE MANAGEMENT/SOCIAL WORK - NSDPDISTO_GEN_ALL_CORE
Medline and carenotes for surgical procedure JOEY drain in place as well as DC Medications and side effects literature for patient reference. Kierra low fat diet well, voiding without difficulty./Home

## 2021-03-11 NOTE — PROGRESS NOTE ADULT - ASSESSMENT
Patient is a 71 year old male with a PMHx of HLD, inguinal hernia repair, appendectomy and GERD who presented with 3 days of abdominal pain.  CT Abdomen / Pelvis performed showing acute cholecystitis.  No intra or extrahepatic biliary ductal dilatation.  Trace right pleural effusion.  Trace perihepatic free fluid adjacent to the right hepatic lobe is likely reactive and related to acute cholecystitis.  Patient is now S/P laparoscopic cholecystectomy on 3/9/21.    Problem/Recommendation - 1:  Problem: Acute cholecystitis. Recommendation:   S/P Lap octavio   orders as per surgery  pain control  feeing as tolerated  monitor for fever  antibiotics per surg    outpatient follow up.    Problem/Recommendation - 2:  ·  Problem: HLD (hyperlipidemia).  Recommendation: statin   lipid panel.     Problem/Recommendation - 3:  ·  Problem: HTN (hypertension).  Recommendation: monitor vitlas  meds as needed.     Problem/Recommendation - 4:  ·  Problem: GERD (gastroesophageal reflux disease).  Recommendation: PPI.     Problem/Recommendation - 5:  ·  Problem: Prophylactic measure.  Recommendation: DVT and gI PPX.

## 2021-03-11 NOTE — PROGRESS NOTE ADULT - ASSESSMENT
Assessment and Plan:  Patient is a 71 year old male with a PMHx of HLD, inguinal hernia repair, appendectomy and GERD who presented with 3 days of abdominal pain.  CT Abdomen / Pelvis performed showing acute cholecystitis.  No intra or extrahepatic biliary ductal dilatation.  Trace right pleural effusion.  Trace perihepatic free fluid adjacent to the right hepatic lobe is likely reactive and related to acute cholecystitis.  Patient is now S/P laparoscopic cholecystectomy on 3/9/21.      Plan:  - Reg diet  - Continue with IV Zosyn, transition to PO abx for dc  - Out of bed  - Pain control  - VTE prophylaxis with Lovenox subcutaneous  - Discharge planning      A team surgery [id number] p/w abd pain and found to have acute cholecystitis, now s/p laparoscopic cholecystectomy (3/9/21). Patient recovering well on floor, hct slightly down today will plan for repeat and if stable d/c home with outpatient follow up.     Plan:  - Reg diet  - Continue with IV Zosyn, transition to PO abx for dc  - Out of bed  - Pain control  - VTE prophylaxis with Lovenox subcutaneous  - Discharge planning  - PM CBC    A team surgery [id number]

## 2021-03-11 NOTE — DISCHARGE NOTE NURSING/CASE MANAGEMENT/SOCIAL WORK - NSDCPECAREGIVERED_GEN_ALL_CORE
Medline and carenotes for surgical procedure Lap octavio, JOEY drain care, Low fat diet, Oxycodone, Augmentin, as well as DC Medications and side effects literature for patient reference.

## 2021-03-11 NOTE — DISCHARGE NOTE NURSING/CASE MANAGEMENT/SOCIAL WORK - PATIENT PORTAL LINK FT
You can access the FollowMyHealth Patient Portal offered by [facility] by registering at the following website: [email or web address]. By joining [facility] FollowMyHealth portal, you will also be able to view your health information using other applications (apps) compatible with our system.

## 2021-03-11 NOTE — PROGRESS NOTE ADULT - ATTENDING COMMENTS
Date of service: 03-09-21 @ 13:39    Patient seen and examined  Risks, benefits, and alternatives discussed  OR for laparoscopic cholecystectomy
Patient seen and examined  Tolerating PO intake  Pain controlled  Abd is soft, not distended, appropriately tender  Drain serosanguinous    - diet as tolerated  - possible discharge tomorrow with drain in place
patient seen and examined  no nausea or vomiting  abd is soft, not tender and not distended  drain serosanguinous  repeat CBC stable    plan for discharge to home today  follow up in office next Wednesday for drain removal  PO Augmentin for 4 more days

## 2021-03-11 NOTE — PROGRESS NOTE ADULT - SUBJECTIVE AND OBJECTIVE BOX
DATE OF SERVICE: 03-11-21     Subjective: Patient seen and examined. No new events except as noted.   doing  okay     REVIEW OF SYSTEMS:    CONSTITUTIONAL: No weakness, fevers or chills  EYES/ENT: No visual changes;  No vertigo or throat pain   NECK: No pain or stiffness  RESPIRATORY: No cough, wheezing, hemoptysis; No shortness of breath  CARDIOVASCULAR: No chest pain or palpitations  GASTROINTESTINAL: No abdominal or epigastric pain.   GENITOURINARY: No dysuria, frequency or hematuria  NEUROLOGICAL: No numbness or weakness  SKIN: No itching, burning, rashes, or lesions   All other review of systems is negative unless indicated above.    MEDICATIONS:  MEDICATIONS  (STANDING):  aspirin enteric coated 81 milliGRAM(s) Oral daily  enoxaparin Injectable 40 milliGRAM(s) SubCutaneous daily  pantoprazole    Tablet 40 milliGRAM(s) Oral before breakfast  piperacillin/tazobactam IVPB.. 3.375 Gram(s) IV Intermittent every 8 hours  simvastatin 20 milliGRAM(s) Oral at bedtime      PHYSICAL EXAM:  T(C): 37.1 (03-11-21 @ 14:50), Max: 37.1 (03-11-21 @ 14:50)  HR: 71 (03-11-21 @ 14:50) (68 - 80)  BP: 114/64 (03-11-21 @ 14:50) (109/60 - 119/62)  RR: 17 (03-11-21 @ 14:50) (16 - 18)  SpO2: 98% (03-11-21 @ 14:50) (93% - 98%)  Wt(kg): --  I&O's Summary    10 Mar 2021 07:01  -  11 Mar 2021 07:00  --------------------------------------------------------  IN: 0 mL / OUT: 535 mL / NET: -535 mL    11 Mar 2021 07:01  -  11 Mar 2021 22:25  --------------------------------------------------------  IN: 0 mL / OUT: 175 mL / NET: -175 mL          Appearance: Normal	  HEENT:  PERRLA   Lymphatic: No lymphadenopathy   Cardiovascular: Normal S1 S2, no JVD  Respiratory: normal effort , clear  Gastrointestinal:  Soft, Non-tender  Skin: No rashes,  warm to touch  Psychiatry:  Mood & affect appropriate  Musculuskeletal: No edema      All labs, Imaging and EKGs personally reviewed                           9.2    13.36 )-----------( 322      ( 11 Mar 2021 13:33 )             29.4               03-11    139  |  101  |  17  ----------------------------<  127<H>  3.7   |  27  |  1.18    Ca    8.5      11 Mar 2021 13:33  Phos  2.5     03-11  Mg     2.3     03-11    TPro  5.8<L>  /  Alb  3.1<L>  /  TBili  0.2  /  DBili  x   /  AST  40  /  ALT  46<H>  /  AlkPhos  68  03-11

## 2021-03-11 NOTE — DISCHARGE NOTE NURSING/CASE MANAGEMENT/SOCIAL WORK - NSDCFUADDAPPT_GEN_ALL_CORE_FT
Follow-up with Dr. Peres in the office as instructed for post-op check as well as with PMD for continuity of care.

## 2021-03-11 NOTE — DISCHARGE NOTE NURSING/CASE MANAGEMENT/SOCIAL WORK - NSDCPNINST_GEN_ALL_CORE
Maintain abdominal incision/scope sites clean and dry, call MD with any signs of infection such as fever, redness or drainage from site. Continue to drink plenty of fluids, avoid strenuous activity and heavy lifting, as well as constipation which may be a side effect from taking narcotic pain medication. Follow up with surgeon in the office for post-op check as instructed, as well as PMD for continuity of care.   Keep Manny Benavides drain clean and dry, empty and monitor drainage as instructed. Record the drainage amts and bring with you to the follow-up postop visit. Call MD with any signs of infection.

## 2021-03-11 NOTE — PROGRESS NOTE ADULT - SUBJECTIVE AND OBJECTIVE BOX
A Team Surgery Daily Progress Note  =====================================================    SUBJECTIVE: Patient seen and examined at bedside on AM rounds. Patient reports that they're feeling well. Tolerating diet, denies nausea, vomiting.    +Flatus/    -BM. OOB/Ambulating as tolerated. Denies fever, chills.     24 HOUR EVENTS: advanced to Reg diet    MEDICATIONS  (STANDING):  aspirin enteric coated 81 milliGRAM(s) Oral daily  enoxaparin Injectable 40 milliGRAM(s) SubCutaneous daily  pantoprazole    Tablet 40 milliGRAM(s) Oral before breakfast  piperacillin/tazobactam IVPB.. 3.375 Gram(s) IV Intermittent every 8 hours  simvastatin 20 milliGRAM(s) Oral at bedtime    MEDICATIONS  (PRN):  acetaminophen   Tablet .. 650 milliGRAM(s) Oral every 6 hours PRN Mild Pain (1 - 3)  oxyCODONE    IR 5 milliGRAM(s) Oral every 6 hours PRN Moderate Pain (4 - 6)  oxyCODONE    IR 10 milliGRAM(s) Oral every 6 hours PRN Severe Pain (7 - 10)      OBJECTIVE:    Vital Signs Last 24 Hrs  T(C): 36.5 (10 Mar 2021 21:20), Max: 37 (10 Mar 2021 06:14)  T(F): 97.7 (10 Mar 2021 21:20), Max: 98.6 (10 Mar 2021 06:14)  HR: 73 (10 Mar 2021 21:20) (73 - 99)  BP: 125/61 (10 Mar 2021 21:20) (104/48 - 125/61)  BP(mean): --  RR: 18 (10 Mar 2021 21:20) (16 - 18)  SpO2: 98% (10 Mar 2021 21:20) (95% - 99%)    PE:  NEUROLOGY  Exam: Normal, in no acute distress.    HEENT  Exam: Normocephalic, atraumatic.    RESPIRATORY  Exam: Normal expansion / effort.    CARDIOVASCULAR  Exam: S1, S2.  Regular rate and rhythm.    GI/NUTRITION  Exam: Abdomen soft, Non-tender, Non-distended.  Port sites clean, dry and intact.  JOEY drain serosanguinous.  Current Diet: Regular diet    MUSCULOSKELETAL  Exam: All extremities moving spontaneously without limitations.        I&O's Detail    09 Mar 2021 07:01  -  10 Mar 2021 07:00  --------------------------------------------------------  IN:    Lactated Ringers: 100 mL    Oral Fluid: 300 mL  Total IN: 400 mL    OUT:    Bulb (mL): 42.5 mL    Voided (mL): 625 mL  Total OUT: 667.5 mL    Total NET: -267.5 mL      10 Mar 2021 07:01  -  11 Mar 2021 01:41  --------------------------------------------------------  IN:  Total IN: 0 mL    OUT:    Bulb (mL): 10 mL  Total OUT: 10 mL    Total NET: -10 mL          Daily     Daily     LABS:                        9.3    15.66 )-----------( 248      ( 10 Mar 2021 06:26 )             29.3     03-10    137  |  101  |  18  ----------------------------<  161<H>  4.6   |  26  |  1.17    Ca    8.6      10 Mar 2021 06:26  Phos  3.5     03-10  Mg     2.3     03-10    TPro  6.0  /  Alb  3.1<L>  /  TBili  0.4  /  DBili  x   /  AST  38  /  ALT  39  /  AlkPhos  74  03-10    PT/INR - ( 09 Mar 2021 11:24 )   PT: 15.7 sec;   INR: 1.40 ratio         PTT - ( 09 Mar 2021 11:24 )  PTT:29.3 sec                       A Team Surgery Daily Progress Note  =====================================================    SUBJECTIVE: Patient seen and examined at bedside on AM rounds. Patient reports that they're feeling well. Tolerating diet, denies nausea, vomiting.    +Flatus/    -BM. OOB/Ambulating as tolerated. Denies fever, chills.     24 HOUR EVENTS: advanced to Reg diet    MEDICATIONS  (STANDING):  aspirin enteric coated 81 milliGRAM(s) Oral daily  enoxaparin Injectable 40 milliGRAM(s) SubCutaneous daily  pantoprazole    Tablet 40 milliGRAM(s) Oral before breakfast  piperacillin/tazobactam IVPB.. 3.375 Gram(s) IV Intermittent every 8 hours  simvastatin 20 milliGRAM(s) Oral at bedtime    MEDICATIONS  (PRN):  acetaminophen   Tablet .. 650 milliGRAM(s) Oral every 6 hours PRN Mild Pain (1 - 3)  oxyCODONE    IR 5 milliGRAM(s) Oral every 6 hours PRN Moderate Pain (4 - 6)  oxyCODONE    IR 10 milliGRAM(s) Oral every 6 hours PRN Severe Pain (7 - 10)    OBJECTIVE:  Vital Signs Last 24 Hrs  T(C): 36.5 (10 Mar 2021 21:20), Max: 37 (10 Mar 2021 06:14)  T(F): 97.7 (10 Mar 2021 21:20), Max: 98.6 (10 Mar 2021 06:14)  HR: 73 (10 Mar 2021 21:20) (73 - 99)  BP: 125/61 (10 Mar 2021 21:20) (104/48 - 125/61)  RR: 18 (10 Mar 2021 21:20) (16 - 18)  SpO2: 98% (10 Mar 2021 21:20) (95% - 99%)    PHYSICAL EXAM:  Gen: WD, WN, in no acute distress.  HEENT: PERRLA, EOMI. oropharynx clear.  Lungs: Respirations unlabored.   Abd: Soft, nontender, nondistended. incisions are c/d/i with operative steri strips in place.     I&O's Detail    09 Mar 2021 07:01  -  10 Mar 2021 07:00  --------------------------------------------------------  IN:    Lactated Ringers: 100 mL    Oral Fluid: 300 mL  Total IN: 400 mL    OUT:    Bulb (mL): 42.5 mL    Voided (mL): 625 mL  Total OUT: 667.5 mL    Total NET: -267.5 mL      10 Mar 2021 07:01  -  11 Mar 2021 01:41  --------------------------------------------------------  IN:  Total IN: 0 mL    OUT:    Bulb (mL): 10 mL  Total OUT: 10 mL    Total NET: -10 mL    LABS:                        9.3    15.66 )-----------( 248      ( 10 Mar 2021 06:26 )             29.3     03-10    137  |  101  |  18  ----------------------------<  161<H>  4.6   |  26  |  1.17    Ca    8.6      10 Mar 2021 06:26  Phos  3.5     03-10  Mg     2.3     03-10    TPro  6.0  /  Alb  3.1<L>  /  TBili  0.4  /  DBili  x   /  AST  38  /  ALT  39  /  AlkPhos  74  03-10    PT/INR - ( 09 Mar 2021 11:24 )   PT: 15.7 sec;   INR: 1.40 ratio    PTT - ( 09 Mar 2021 11:24 )  PTT:29.3 sec

## 2021-03-12 ENCOUNTER — NON-APPOINTMENT (OUTPATIENT)
Age: 71
End: 2021-03-12

## 2021-03-12 RX ORDER — ACETAMINOPHEN 325 MG/1
325 TABLET ORAL EVERY 6 HOURS
Refills: 0 | Status: ACTIVE | COMMUNITY
Start: 2021-03-12

## 2021-03-12 RX ORDER — SODIUM SULFATE, POTASSIUM SULFATE, MAGNESIUM SULFATE 17.5; 3.13; 1.6 G/ML; G/ML; G/ML
17.5-3.13-1.6 SOLUTION, CONCENTRATE ORAL
Qty: 1 | Refills: 0 | Status: DISCONTINUED | COMMUNITY
Start: 2019-09-04 | End: 2021-03-12

## 2021-03-16 LAB — SURGICAL PATHOLOGY STUDY: SIGNIFICANT CHANGE UP

## 2021-03-31 ENCOUNTER — APPOINTMENT (OUTPATIENT)
Dept: INTERNAL MEDICINE | Facility: CLINIC | Age: 71
End: 2021-03-31
Payer: MEDICARE

## 2021-03-31 VITALS
BODY MASS INDEX: 26.37 KG/M2 | HEIGHT: 68 IN | SYSTOLIC BLOOD PRESSURE: 126 MMHG | WEIGHT: 174 LBS | DIASTOLIC BLOOD PRESSURE: 78 MMHG | OXYGEN SATURATION: 96 % | HEART RATE: 75 BPM | TEMPERATURE: 98.2 F

## 2021-03-31 DIAGNOSIS — Z90.49 ACQUIRED ABSENCE OF OTHER SPECIFIED PARTS OF DIGESTIVE TRACT: ICD-10-CM

## 2021-03-31 PROCEDURE — 99213 OFFICE O/P EST LOW 20 MIN: CPT | Mod: 25

## 2021-03-31 PROCEDURE — 36415 COLL VENOUS BLD VENIPUNCTURE: CPT

## 2021-03-31 RX ORDER — AMOXICILLIN AND CLAVULANATE POTASSIUM 875; 125 MG/1; MG/1
875-125 TABLET, COATED ORAL TWICE DAILY
Qty: 20 | Refills: 0 | Status: DISCONTINUED | COMMUNITY
Start: 2020-09-30 | End: 2021-03-31

## 2021-03-31 NOTE — HISTORY OF PRESENT ILLNESS
[FreeTextEntry1] : s/p gb [de-identified] : s/p gb\par lap choly\par increased wbc\par drop in hct\par feel well\par \par

## 2021-04-01 LAB
ALBUMIN SERPL ELPH-MCNC: 4.3 G/DL
ALP BLD-CCNC: 68 U/L
ALT SERPL-CCNC: 15 U/L
ANION GAP SERPL CALC-SCNC: 11 MMOL/L
AST SERPL-CCNC: 20 U/L
BASOPHILS # BLD AUTO: 0.07 K/UL
BASOPHILS NFR BLD AUTO: 0.9 %
BILIRUB SERPL-MCNC: 0.3 MG/DL
BUN SERPL-MCNC: 19 MG/DL
CALCIUM SERPL-MCNC: 9.9 MG/DL
CHLORIDE SERPL-SCNC: 103 MMOL/L
CO2 SERPL-SCNC: 26 MMOL/L
CREAT SERPL-MCNC: 1.14 MG/DL
EOSINOPHIL # BLD AUTO: 0.21 K/UL
EOSINOPHIL NFR BLD AUTO: 2.8 %
GLUCOSE SERPL-MCNC: 100 MG/DL
HCT VFR BLD CALC: 36.5 %
HGB BLD-MCNC: 12 G/DL
IMM GRANULOCYTES NFR BLD AUTO: 0.3 %
LPL SERPL-CCNC: 28 U/L
LYMPHOCYTES # BLD AUTO: 1.39 K/UL
LYMPHOCYTES NFR BLD AUTO: 18.4 %
MAN DIFF?: NORMAL
MCHC RBC-ENTMCNC: 30.5 PG
MCHC RBC-ENTMCNC: 32.9 GM/DL
MCV RBC AUTO: 92.6 FL
MONOCYTES # BLD AUTO: 0.6 K/UL
MONOCYTES NFR BLD AUTO: 7.9 %
NEUTROPHILS # BLD AUTO: 5.26 K/UL
NEUTROPHILS NFR BLD AUTO: 69.7 %
PLATELET # BLD AUTO: 388 K/UL
POTASSIUM SERPL-SCNC: 5.6 MMOL/L
PROT SERPL-MCNC: 6.5 G/DL
RBC # BLD: 3.94 M/UL
RBC # FLD: 13.9 %
SODIUM SERPL-SCNC: 140 MMOL/L
WBC # FLD AUTO: 7.55 K/UL

## 2021-08-20 ENCOUNTER — NON-APPOINTMENT (OUTPATIENT)
Age: 71
End: 2021-08-20

## 2021-08-20 PROBLEM — E78.5 HYPERLIPIDEMIA, UNSPECIFIED: Chronic | Status: ACTIVE | Noted: 2021-03-08

## 2021-08-20 PROBLEM — K21.9 GASTRO-ESOPHAGEAL REFLUX DISEASE WITHOUT ESOPHAGITIS: Chronic | Status: ACTIVE | Noted: 2021-03-08

## 2021-09-28 ENCOUNTER — APPOINTMENT (OUTPATIENT)
Dept: INTERNAL MEDICINE | Facility: CLINIC | Age: 71
End: 2021-09-28

## 2021-09-29 ENCOUNTER — APPOINTMENT (OUTPATIENT)
Dept: INTERNAL MEDICINE | Facility: CLINIC | Age: 71
End: 2021-09-29

## 2021-11-22 NOTE — PHYSICAL EXAM
[General Appearance - In No Acute Distress] : in no acute distress [General Appearance - Alert] : alert [Outer Ear] : the ears and nose were normal in appearance [Neck Appearance] : the appearance of the neck was normal [] : no respiratory distress [Respiration, Rhythm And Depth] : normal respiratory rhythm and effort [Heart Rate And Rhythm] : heart rate was normal and rhythm regular [Apical Impulse] : the apical impulse was normal [Abdominal Aorta] : the abdominal aorta was normal [Bowel Sounds] : normal bowel sounds [Abdomen Soft] : soft [No CVA Tenderness] : no ~M costovertebral angle tenderness [No Spinal Tenderness] : no spinal tenderness detailed exam

## 2021-12-29 ENCOUNTER — TRANSCRIPTION ENCOUNTER (OUTPATIENT)
Age: 71
End: 2021-12-29

## 2022-03-08 ENCOUNTER — TRANSCRIPTION ENCOUNTER (OUTPATIENT)
Age: 72
End: 2022-03-08

## 2022-08-17 ENCOUNTER — APPOINTMENT (OUTPATIENT)
Dept: FAMILY MEDICINE | Facility: CLINIC | Age: 72
End: 2022-08-17

## 2022-08-17 VITALS
HEIGHT: 68 IN | WEIGHT: 175 LBS | DIASTOLIC BLOOD PRESSURE: 70 MMHG | TEMPERATURE: 98.2 F | BODY MASS INDEX: 26.52 KG/M2 | HEART RATE: 76 BPM | OXYGEN SATURATION: 96 % | SYSTOLIC BLOOD PRESSURE: 132 MMHG

## 2022-08-17 DIAGNOSIS — K46.9 UNSPECIFIED ABDOMINAL HERNIA W/OUT OBSTRUCTION OR GANGRENE: ICD-10-CM

## 2022-08-17 PROCEDURE — 36415 COLL VENOUS BLD VENIPUNCTURE: CPT

## 2022-08-17 PROCEDURE — 99214 OFFICE O/P EST MOD 30 MIN: CPT | Mod: 25

## 2022-08-17 PROCEDURE — 93000 ELECTROCARDIOGRAM COMPLETE: CPT

## 2022-08-17 RX ORDER — BENZOYL PEROXIDE 100 MG/ML
10 LIQUID TOPICAL
Qty: 1 | Refills: 3 | Status: DISCONTINUED | COMMUNITY
Start: 2021-02-12 | End: 2022-08-17

## 2022-08-17 RX ORDER — METRONIDAZOLE 7.5 MG/G
0.75 CREAM TOPICAL DAILY
Qty: 1 | Refills: 0 | Status: DISCONTINUED | COMMUNITY
Start: 2017-10-02 | End: 2022-08-17

## 2022-08-17 RX ORDER — OXYCODONE 5 MG/1
5 TABLET ORAL EVERY 6 HOURS
Refills: 0 | Status: DISCONTINUED | COMMUNITY
Start: 2021-03-12 | End: 2022-08-17

## 2022-08-17 RX ORDER — KETOCONAZOLE 20.5 MG/ML
2 SHAMPOO, SUSPENSION TOPICAL
Qty: 1 | Refills: 3 | Status: DISCONTINUED | COMMUNITY
Start: 2018-10-01 | End: 2022-08-17

## 2022-08-17 RX ORDER — DESONIDE 0.5 MG/ML
0.05 LOTION TOPICAL
Qty: 118 | Refills: 2 | Status: DISCONTINUED | COMMUNITY
Start: 2017-09-06 | End: 2022-08-17

## 2022-08-17 RX ORDER — KETOCONAZOLE 20.5 MG/ML
2 SHAMPOO, SUSPENSION TOPICAL
Qty: 60 | Refills: 2 | Status: DISCONTINUED | COMMUNITY
Start: 2017-09-06 | End: 2022-08-17

## 2022-08-17 RX ORDER — DESONIDE 0.5 MG/G
0.05 CREAM TOPICAL
Qty: 1 | Refills: 2 | Status: DISCONTINUED | COMMUNITY
Start: 2018-10-01 | End: 2022-08-17

## 2022-08-17 RX ORDER — METRONIDAZOLE 10 MG/G
1 GEL TOPICAL
Qty: 1 | Refills: 2 | Status: DISCONTINUED | COMMUNITY
Start: 2018-10-01 | End: 2022-08-17

## 2022-08-17 NOTE — RESULTS/DATA
[] : results reviewed [de-identified] : wnl [de-identified] : wnl [de-identified] : wnl [de-identified] : wnl [de-identified] : wnl

## 2022-08-17 NOTE — HISTORY OF PRESENT ILLNESS
[No Pertinent Cardiac History] : no history of aortic stenosis, atrial fibrillation, coronary artery disease, recent myocardial infarction, or implantable device/pacemaker [No Pertinent Pulmonary History] : no history of asthma, COPD, sleep apnea, or smoking [No Adverse Anesthesia Reaction] : no adverse anesthesia reaction in self or family member [Chronic Anticoagulation] : no chronic anticoagulation [Chronic Kidney Disease] : no chronic kidney disease [Diabetes] : no diabetes [(Patient denies any chest pain, claudication, dyspnea on exertion, orthopnea, palpitations or syncope)] : Patient denies any chest pain, claudication, dyspnea on exertion, orthopnea, palpitations or syncope [FreeTextEntry1] : hernia repair [FreeTextEntry2] : 8/18/2022 [FreeTextEntry3] : Dr. Eric Armstrong

## 2022-11-22 ENCOUNTER — NON-APPOINTMENT (OUTPATIENT)
Age: 72
End: 2022-11-22

## 2022-12-01 ENCOUNTER — APPOINTMENT (OUTPATIENT)
Dept: FAMILY MEDICINE | Facility: CLINIC | Age: 72
End: 2022-12-01

## 2022-12-27 ENCOUNTER — APPOINTMENT (OUTPATIENT)
Dept: FAMILY MEDICINE | Facility: CLINIC | Age: 72
End: 2022-12-27

## 2022-12-27 ENCOUNTER — NON-APPOINTMENT (OUTPATIENT)
Age: 72
End: 2022-12-27

## 2022-12-27 VITALS
DIASTOLIC BLOOD PRESSURE: 74 MMHG | HEART RATE: 63 BPM | BODY MASS INDEX: 26.52 KG/M2 | SYSTOLIC BLOOD PRESSURE: 124 MMHG | HEIGHT: 68 IN | OXYGEN SATURATION: 97 % | WEIGHT: 175 LBS | TEMPERATURE: 67.8 F

## 2022-12-27 DIAGNOSIS — K21.9 GASTRO-ESOPHAGEAL REFLUX DISEASE W/OUT ESOPHAGITIS: ICD-10-CM

## 2022-12-27 DIAGNOSIS — Z01.818 ENCOUNTER FOR OTHER PREPROCEDURAL EXAMINATION: ICD-10-CM

## 2022-12-27 PROCEDURE — 93000 ELECTROCARDIOGRAM COMPLETE: CPT

## 2022-12-27 PROCEDURE — 36415 COLL VENOUS BLD VENIPUNCTURE: CPT

## 2022-12-27 PROCEDURE — 99214 OFFICE O/P EST MOD 30 MIN: CPT | Mod: 25

## 2022-12-28 LAB
ALBUMIN SERPL ELPH-MCNC: 4.4 G/DL
ALP BLD-CCNC: 63 U/L
ALT SERPL-CCNC: 21 U/L
ANION GAP SERPL CALC-SCNC: 11 MMOL/L
APPEARANCE: CLEAR
APTT BLD: 31.5 SEC
AST SERPL-CCNC: 19 U/L
BASOPHILS # BLD AUTO: 0.07 K/UL
BASOPHILS NFR BLD AUTO: 0.7 %
BILIRUB SERPL-MCNC: 0.3 MG/DL
BILIRUBIN URINE: NEGATIVE
BLOOD URINE: NEGATIVE
BUN SERPL-MCNC: 19 MG/DL
CALCIUM SERPL-MCNC: 9.9 MG/DL
CHLORIDE SERPL-SCNC: 100 MMOL/L
CHOLEST SERPL-MCNC: 185 MG/DL
CO2 SERPL-SCNC: 27 MMOL/L
COLOR: NORMAL
CREAT SERPL-MCNC: 1.18 MG/DL
EGFR: 65 ML/MIN/1.73M2
EOSINOPHIL # BLD AUTO: 0.27 K/UL
EOSINOPHIL NFR BLD AUTO: 2.6 %
GLUCOSE QUALITATIVE U: NEGATIVE
GLUCOSE SERPL-MCNC: 91 MG/DL
HCT VFR BLD CALC: 42 %
HDLC SERPL-MCNC: 62 MG/DL
HGB BLD-MCNC: 14.1 G/DL
IMM GRANULOCYTES NFR BLD AUTO: 0.4 %
INR PPP: 1.03 RATIO
KETONES URINE: NEGATIVE
LDLC SERPL CALC-MCNC: 98 MG/DL
LEUKOCYTE ESTERASE URINE: NEGATIVE
LYMPHOCYTES # BLD AUTO: 1.5 K/UL
LYMPHOCYTES NFR BLD AUTO: 14.7 %
MAN DIFF?: NORMAL
MCHC RBC-ENTMCNC: 31 PG
MCHC RBC-ENTMCNC: 33.6 GM/DL
MCV RBC AUTO: 92.3 FL
MONOCYTES # BLD AUTO: 0.57 K/UL
MONOCYTES NFR BLD AUTO: 5.6 %
NEUTROPHILS # BLD AUTO: 7.74 K/UL
NEUTROPHILS NFR BLD AUTO: 76 %
NITRITE URINE: NEGATIVE
NONHDLC SERPL-MCNC: 123 MG/DL
PH URINE: 6
PLATELET # BLD AUTO: 305 K/UL
POTASSIUM SERPL-SCNC: 4.5 MMOL/L
PROT SERPL-MCNC: 6.8 G/DL
PROTEIN URINE: NEGATIVE
PT BLD: 12 SEC
RBC # BLD: 4.55 M/UL
RBC # FLD: 13.2 %
SODIUM SERPL-SCNC: 137 MMOL/L
SPECIFIC GRAVITY URINE: 1.01
TRIGL SERPL-MCNC: 128 MG/DL
TSH SERPL-ACNC: 1.23 UIU/ML
UROBILINOGEN URINE: NORMAL
WBC # FLD AUTO: 10.19 K/UL

## 2023-02-23 ENCOUNTER — APPOINTMENT (OUTPATIENT)
Dept: ORTHOPEDIC SURGERY | Facility: CLINIC | Age: 73
End: 2023-02-23
Payer: MEDICARE

## 2023-02-23 VITALS — WEIGHT: 180 LBS | HEIGHT: 68 IN | BODY MASS INDEX: 27.28 KG/M2

## 2023-02-23 DIAGNOSIS — M75.82 OTHER SHOULDER LESIONS, LEFT SHOULDER: ICD-10-CM

## 2023-02-23 DIAGNOSIS — E78.00 PURE HYPERCHOLESTEROLEMIA, UNSPECIFIED: ICD-10-CM

## 2023-02-23 PROCEDURE — J3490M: CUSTOM | Mod: NC

## 2023-02-23 PROCEDURE — 99203 OFFICE O/P NEW LOW 30 MIN: CPT | Mod: 25

## 2023-02-23 PROCEDURE — 20611 DRAIN/INJ JOINT/BURSA W/US: CPT

## 2023-02-23 PROCEDURE — 73010 X-RAY EXAM OF SHOULDER BLADE: CPT | Mod: LT

## 2023-02-23 PROCEDURE — 73030 X-RAY EXAM OF SHOULDER: CPT | Mod: LT

## 2023-02-23 NOTE — PROCEDURE
[FreeTextEntry3] : Large joint injection was performed of the left shoulder. An injection of Lidocaine 3cc of 1% , Bupivacaine (Marcaine) 6cc of 0.5% , Triamcinolone (Kenalog) 2cc of 40 mg  was used. Patient was advised to call if redness, pain or fever occur and apply ice for 15 minutes out of every hour for the next 12-24 hours as tolerated. \par \par Patient has tried OTC's including aspirin, Ibuprofen, Aleve, etc or prescription NSAIDS, and/or exercises at home and/or physical therapy without satisfactory response, patient had decreased mobility in the joint and the risks benefits, and alternatives have been discussed, and verbal consent was obtained. \par The site was prepped with alcohol, betadine and ethyl chloride sprayed topically\par \par The risks, benefits and contents of the injection have been discussed.  Risks include but are not limited to allergic reaction, flare reaction, permanent white skin discoloration at the injection site and infection.  The patient understands the risks and agrees to having the injection.  All questions have been answered.\par \par Ultrasound guidance was indicated for this patient due to prior failure or difficult injection. All ultrasound images have been permanently captured and stored accordingly in our picture archiving and communication system.

## 2023-02-23 NOTE — DISCUSSION/SUMMARY
[de-identified] : 73m with left shoulder rotator cuff tendinitis\par 1) csi left shoulder today - tolerated well\par 2) cryotherapy, rest and activity modification\par 3) start physical therapy \par 4) rtc 6 weeks\par \par Entered by Bell Arzola acting as scribe.\par

## 2023-02-23 NOTE — PHYSICAL EXAM
[Left] : left shoulder [NL (0-180)] : full active abduction 0-180 degrees [NL (0-70)] : full internal rotation 0-70 degrees [5 ___] : forward flexion 5[unfilled]/5 [4___] : abduction 4[unfilled]/5 [5___] : internal rotation 5[unfilled]/5 [] : negative Speed's [de-identified] : +mary beth [de-identified] : external rotation at 90 degrees of abduction 90 degrees

## 2023-02-23 NOTE — HISTORY OF PRESENT ILLNESS
[3] : 3 [10] : 10 [Stabbing] : stabbing [Meds] : meds [de-identified] : 02/23/2023 Mr. DUARTE VALENCIA, a 73 year old RHD male, presents today for left shoulder pain x 6 days, denies injury or trauma.  Reports lateral left shoulder pain, aggravated with reaching outwards or OH. Reports pain is bothersome at night. Active with exercises and kayaking. \par Retired\par  [] : no [FreeTextEntry1] : left shoulder  [FreeTextEntry5] :  DUARTE VALENCIA is a 73 year male who is here today for left shoulder pain, states he started having pain a week ago. NKI.

## 2023-04-06 ENCOUNTER — APPOINTMENT (OUTPATIENT)
Dept: ORTHOPEDIC SURGERY | Facility: CLINIC | Age: 73
End: 2023-04-06

## 2023-10-04 ENCOUNTER — APPOINTMENT (OUTPATIENT)
Dept: UROLOGY | Facility: CLINIC | Age: 73
End: 2023-10-04
Payer: MEDICARE

## 2023-10-04 VITALS
WEIGHT: 175 LBS | BODY MASS INDEX: 26.52 KG/M2 | TEMPERATURE: 98.2 F | SYSTOLIC BLOOD PRESSURE: 111 MMHG | DIASTOLIC BLOOD PRESSURE: 70 MMHG | HEIGHT: 68 IN | OXYGEN SATURATION: 95 % | HEART RATE: 79 BPM

## 2023-10-04 DIAGNOSIS — Z82.49 FAMILY HISTORY OF ISCHEMIC HEART DISEASE AND OTHER DISEASES OF THE CIRCULATORY SYSTEM: ICD-10-CM

## 2023-10-04 DIAGNOSIS — Z78.9 OTHER SPECIFIED HEALTH STATUS: ICD-10-CM

## 2023-10-04 DIAGNOSIS — Z63.4 DISAPPEARANCE AND DEATH OF FAMILY MEMBER: ICD-10-CM

## 2023-10-04 DIAGNOSIS — C61 MALIGNANT NEOPLASM OF PROSTATE: ICD-10-CM

## 2023-10-04 PROCEDURE — 99204 OFFICE O/P NEW MOD 45 MIN: CPT

## 2023-10-04 RX ORDER — ROSUVASTATIN CALCIUM 40 MG/1
40 TABLET, FILM COATED ORAL
Refills: 0 | Status: ACTIVE | COMMUNITY

## 2023-10-04 SDOH — SOCIAL STABILITY - SOCIAL INSECURITY: DISSAPEARANCE AND DEATH OF FAMILY MEMBER: Z63.4

## 2024-02-14 NOTE — ED ADULT NURSE NOTE - ISOLATION TYPE:
No evidence of recurrent or metastatic cancer of your left vocal cord.  Follow-up in 6 months time, sooner if problems.    
None

## 2024-05-09 ENCOUNTER — APPOINTMENT (OUTPATIENT)
Dept: MRI IMAGING | Facility: IMAGING CENTER | Age: 74
End: 2024-05-09
Payer: MEDICARE

## 2024-05-09 ENCOUNTER — OUTPATIENT (OUTPATIENT)
Dept: OUTPATIENT SERVICES | Facility: HOSPITAL | Age: 74
LOS: 1 days | End: 2024-05-09
Payer: MEDICARE

## 2024-05-09 DIAGNOSIS — Z90.49 ACQUIRED ABSENCE OF OTHER SPECIFIED PARTS OF DIGESTIVE TRACT: Chronic | ICD-10-CM

## 2024-05-09 DIAGNOSIS — Z98.890 OTHER SPECIFIED POSTPROCEDURAL STATES: Chronic | ICD-10-CM

## 2024-05-09 DIAGNOSIS — C61 MALIGNANT NEOPLASM OF PROSTATE: ICD-10-CM

## 2024-05-10 ENCOUNTER — RESULT REVIEW (OUTPATIENT)
Age: 74
End: 2024-05-10

## 2024-05-10 PROCEDURE — 76498 UNLISTED MR PROCEDURE: CPT

## 2024-05-10 PROCEDURE — 72197 MRI PELVIS W/O & W/DYE: CPT

## 2024-05-10 PROCEDURE — A9585: CPT

## 2024-05-10 PROCEDURE — 72197 MRI PELVIS W/O & W/DYE: CPT | Mod: 26,MH

## 2024-05-10 PROCEDURE — 76498P: CUSTOM | Mod: 26,MH

## 2024-05-15 ENCOUNTER — APPOINTMENT (OUTPATIENT)
Dept: UROLOGY | Facility: CLINIC | Age: 74
End: 2024-05-15
Payer: MEDICARE

## 2024-05-15 VITALS
DIASTOLIC BLOOD PRESSURE: 77 MMHG | TEMPERATURE: 98.3 F | OXYGEN SATURATION: 96 % | SYSTOLIC BLOOD PRESSURE: 126 MMHG | HEART RATE: 80 BPM

## 2024-05-15 DIAGNOSIS — N40.1 BENIGN PROSTATIC HYPERPLASIA WITH LOWER URINARY TRACT SYMPMS: ICD-10-CM

## 2024-05-15 DIAGNOSIS — N13.8 BENIGN PROSTATIC HYPERPLASIA WITH LOWER URINARY TRACT SYMPMS: ICD-10-CM

## 2024-05-15 PROCEDURE — 99214 OFFICE O/P EST MOD 30 MIN: CPT

## 2024-05-15 NOTE — ADDENDUM
[FreeTextEntry1] :   I, Dr. Adams, personally performed the evaluation and management (E/M) services for this established patient who presents today with (a) new problem(s)/exacerbation of (an) existing condition(s).  That E/M includes conducting the examination, assessing all new/exacerbated conditions, and establishing a new plan of care.  Today, my ACP, Deja Quarles, ANP-BC, was here to observe my evaluation and management services for this new problem/exacerbated condition to be followed going forward.

## 2024-05-15 NOTE — HISTORY OF PRESENT ILLNESS
[FreeTextEntry1] : Dear MAGDI Aguilar  Thank you so much for the referral to help care for your patient.  Chief Complaint: Prostate Cancer Date of first visit: 10/04/2023  DUARTE VALENCIA, is a 74 year old White race man who presents for Prostate Cancer. He was diagnosed with Maxton GG1 (3+3=6) in January (Dr. Curry/Kesha) who initially recommended active surveillance. Presents today for second opinion and consideration of focal therapy. MRI showed anterior lesion and biopsy was only positive at this site.  No known family history for prostate cancer (father  from cirrhosis). Decipher reported 2023 - Risk: low, score: 0.26  Previous surgeries: appendectomy, orthopedic (back/neck), tonsillectomy Meds: Rosuvastatin, omeprazole NKDA Ex-smoker (quit 30+ years ago)  PSA Hx: 4.8  24 4.56 23 4.56 23 3.02 19 2.77 17 2.76 16 2.26 07/02/15 0.87 05  Biopsy Hx: 2023 with ORI [facility] - Target Zone # 1 RMTZ: atypical small acinar proliferation (ASAP), 1.0 cm - Target Zone # 1 RMTZ: adenocarcinoma, GS 6(3+3), GG 1, tumor involving 5% of 1 core, 0.13 cm - Target Zone # 1 RMTZ: adenocarcinoma, GS 6(3+3), GG 1, tumor involving 10% of 1 core, 0.09 cm  Ultrasound Hx: 2021 US Abdomen. Cholelithiasis with nonmobile stone in the neck of the gallbladder. No biliary ductal dilatation. Findings may be seen in the setting of acute cholecystitis in the appropriate clinical context. Correlate with clinical and laboratory findings. If additional imaging is clinically indicated, consider nuclear medicine hepatobiliary scan.  CT Hx: 2021 CT Abdomen and Pelvis. Acute cholecystitis. No intra or extrahepatic biliary ductal dilatation. Trace right pleural effusion. Trace perihepatic free fluid adjacent to the right hepatic lobe is likely reactive and related to scute cholecystitis.  MRI in [facility] 5/10/24  Stable lesion in the midline TZa compared with prior MRI 2023. No new prostate lesions. PIRADS 4 - High (clinically significant cancer is likely to be present) PRECISE 3 - Stable MRI appearance: no new focal/diffuse lesions Asymmetric wall thickening in the sigmoid colon, likely also seen on prior MRI 2023. Differential includes chronic diverticular disease versus colon neoplasm. Consider correlation with colonoscopy if not recently performed. Prostate Volume: 32.1 mL PSA density: Unknown ng/mL/mL *Lesion able to be identified with DWI restriction and ADC abnormality as far back as , MRI negative read (, )*  MRI read 2023 at [facility]. 33 cc prostate with PIRADS 3 lesion measuring 1 cm in the mid gland midline anterior transition zone. No LAD No EPE, No Bony Lesions. The images have been reviewed and clinical implications discussed with the patient.  MRI read 2020 at [facility]. 36 cc prostate with PIRADS 2 no lesion. No LAD No EPE, No Bony Lesions. The images have been reviewed and clinical implications discussed with the patient.  MRI read 2019 at [facility]. 23 cc prostate with PIRADS 2 no lesion. No LAD No EPE, No Bony Lesions. The images have been reviewed and clinical implications discussed with the patient.  MRI read 2018 at [facility]. 18 cc prostate with PIRADS 3 lesion measuring 6 mm in the midline, anterior, midgland, transition zone. No LAD No EPE, No Bony Lesions. The images have been reviewed and clinical implications discussed with the patient.  2024 IPSS  21  QOL   2 JOEY refused   10/04/2023 IPSS 12 QOL 2 JOEY NSA EPIC 26  The patient denies fevers, chills, nausea and or vomiting and no unexplained weight loss.  All pertinent laboratory, films and physician notes were reviewed. Questionnaire results were discussed with patient.  I spent 31 minutes of non-face-to-face time reviewing the patient's records chart, uploading processing MR images, transferring MR images from PACS to an independent workstation, reviewing images on an independent workstation, speaking with their physician and planning their prostate biopsy and preparation of an upcoming visit for 5/15/24 . The imaging and planning was highly complex and took this entire time.   Discussion with the patient: Risks/Benefits/FDA recommendations for prostate cancer screening, the natural history of prostate cancer, the concept of "competing risks", options for treatment including active surveillance, open and laparoscopic (Robotic) radical prostatectomy, external beam radiation therapy, interstitial brachytherapy ("seeds"), combined therapies, hormonal therapies, orchiectomy, and cryotherapy. The potential side effects, early and delayed risks, and effectiveness of each treatment was discussed. The specific details of this patient's disease and their bearing on the above were discussed. The patient was offered the opportunity to meet with the Radiation Oncologists. The patient asked appropriate contextual questions, indicating a good level of understanding.  We discussed the surgical options for management, including robotic and open prostatectomy. The patient understands that the risks of these procedures include bleeding infection, damage to the rectum and surrounding organs, and ED and urinary incontinence, both of which may be persistent. The advantages include removing the entire prostate for more accurate pathologic staging. There is more blood loss with the open approach than with the laparoscopic approach.  We also discussed the options of radiation (external beam, stereotactic body radiation therapy, brachytherapy or combination therapies). Definitive brachytherapy at our institution is done with Pd-103 either as monotherapy or in combination with external beam radiation (Dependent on risk). The patient understands that the risks of radiation include increased LUTS, diarrhea, hematuria, difficulty urinating, ED, and urinary frequency. Based upon the patient's PSA/pathology and risk stratification, the patient not need androgen deprivation in the neoadjuvant and adjuvant setting if he chooses a radiation monotherapy.  We also discussed active surveillance. The patient understands that this is not a treatment but a way of monitoring potentially indolent disease. The patient understands that this has minimal side effects but there is a risk of disease progression.  We have discussed the use of focal therapy and cancer control. The goal be to treat the high-risk area and follow the low-risk areas which are not clinically significant. I describe the difference between whole gland therapy and focal therapy with respect to cancerous control versus cancer cure. We talked about the risks of focal therapy damage to adjacent structures; however, the probability of urinary incontinence and erectile dysfunction is lower.  The patient and I discussed all of these options as well as their risks and benefits, and I believe I answered his questions. Based on the patient's disease characteristics, I feel the patient would be a good candidate for AS given GG1 with low risk decipher The patient decided to continue with AS  I spent 31 minutes of non-face-to-face time reviewing the patient's records chart, uploading processing MR images, transferring MR images from PACS to an independent workstation, reviewing images on an independent workstation, speaking with their physician and planning their prostate biopsy and preparation of an upcoming visit for 05/15/2024 .  The imaging and planning was highly complex and took this entire time.

## 2024-05-15 NOTE — PHYSICAL EXAM
[] : no respiratory distress [Normal] : no focal deficits [Oriented To Time, Place, And Person] : oriented to person, place, and time [FreeTextEntry1] : 73yo Male with Pramod GG1 (3+3=6) low volume prostate cancer (January 2023) - Very low risk risk stratification. Genomics ([employer]) Low risk; score: 0.26.  MRI visible lesion as far as 2019, increased characteristics on most recent scan Proceed with confirmatory biopsy  He understands that many men with prostate cancer will die with the disease rather than of it and we also discussed the results large multi-center American and  prostate cancer screening trials. He also understands that PSA in and of itself does not diagnose prostate cancer but only assesses risk to a certain degree. The patient understands that to definitively screen for prostate cancer, a biopsy is required and this procedure has risks, including bleeding, infection, ED and urinary retention. The patient opted to move forward with the biopsy.   The patient is aware to expect hematuria x 2 weeks and upto 4 weeks of hematospermia.  There is a risk of infection albeit much lower than a transrectal approach. Any fever/chills after the biopsy the patient is to contact the office and go to the ER for an immediate evaluation. He has been given paper instructions outlining these items - which includes medications to avoid prior to surgery.   1. CBC, BMP, PSA, UA UCx 2. Medical Clearance 3. TP biopsy at [facility] 4. follow up 2 weeks after biopsy with his primary urologist or ourselves. 5. we will call with the path results once they are resulted.   Thank you very much for allowing me to assist in the care of this patient. Please do not hesitate to contact me with any additional questions or concerns.      Sincerely,     Dangelo Adams D.O. Professor of Urology and Radiology  of Urology at [facility] Director for Prostate Cancer [address] [facility], [facility] Phone: [phone]

## 2024-05-16 LAB
ALBUMIN SERPL ELPH-MCNC: 4.5 G/DL
ALP BLD-CCNC: 64 U/L
ALT SERPL-CCNC: 24 U/L
ANION GAP SERPL CALC-SCNC: 13 MMOL/L
APPEARANCE: CLEAR
AST SERPL-CCNC: 27 U/L
BACTERIA: NEGATIVE /HPF
BILIRUB SERPL-MCNC: 0.4 MG/DL
BILIRUBIN URINE: NEGATIVE
BLOOD URINE: NEGATIVE
BUN SERPL-MCNC: 18 MG/DL
CALCIUM SERPL-MCNC: 9.8 MG/DL
CAST: 2 /LPF
CHLORIDE SERPL-SCNC: 101 MMOL/L
CO2 SERPL-SCNC: 24 MMOL/L
COLOR: YELLOW
CREAT SERPL-MCNC: 1.03 MG/DL
EGFR: 75 ML/MIN/1.73M2
EPITHELIAL CELLS: 0 /HPF
GLUCOSE QUALITATIVE U: NEGATIVE MG/DL
GLUCOSE SERPL-MCNC: 94 MG/DL
HCT VFR BLD CALC: 42.5 %
HGB BLD-MCNC: 14 G/DL
KETONES URINE: NEGATIVE MG/DL
LEUKOCYTE ESTERASE URINE: NEGATIVE
MCHC RBC-ENTMCNC: 30.8 PG
MCHC RBC-ENTMCNC: 32.9 GM/DL
MCV RBC AUTO: 93.4 FL
MICROSCOPIC-UA: NORMAL
NITRITE URINE: NEGATIVE
PH URINE: 5.5
PLATELET # BLD AUTO: 301 K/UL
POTASSIUM SERPL-SCNC: 4.8 MMOL/L
PROT SERPL-MCNC: 6.9 G/DL
PROTEIN URINE: NEGATIVE MG/DL
RBC # BLD: 4.55 M/UL
RBC # FLD: 13.6 %
RED BLOOD CELLS URINE: 1 /HPF
SODIUM SERPL-SCNC: 138 MMOL/L
SPECIFIC GRAVITY URINE: 1.02
UROBILINOGEN URINE: 0.2 MG/DL
WBC # FLD AUTO: 9.58 K/UL
WHITE BLOOD CELLS URINE: 1 /HPF

## 2024-05-17 LAB — BACTERIA UR CULT: NORMAL

## 2024-05-22 RX ORDER — SIMVASTATIN 20 MG/1
1 TABLET, FILM COATED ORAL
Qty: 0 | Refills: 0 | DISCHARGE

## 2024-05-22 NOTE — ASU PATIENT PROFILE, ADULT - FALL HARM RISK - UNIVERSAL INTERVENTIONS
Bed in lowest position, wheels locked, appropriate side rails in place/Call bell, personal items and telephone in reach/Instruct patient to call for assistance before getting out of bed or chair/Non-slip footwear when patient is out of bed/[place] to call system/Physically safe environment - no spills, clutter or unnecessary equipment/Purposeful Proactive Rounding/Room/bathroom lighting operational, light cord in reach

## 2024-05-22 NOTE — ASU PATIENT PROFILE, ADULT - NSICDXPASTSURGICALHX_GEN_ALL_CORE_FT
PAST SURGICAL HISTORY:  S/P appendectomy     S/P cholecystectomy     S/P inguinal hernia repair X4    S/P tonsillectomy

## 2024-05-22 NOTE — ASU PATIENT PROFILE, ADULT - NS PREOP UNDERSTANDS INFO
PO solids after 12 midnight, clear liquids until 04:30  am on DOS, bring photo ID and insurance card.  Escort need to bring photo ID , address and phone # reviewed with pt/yes

## 2024-05-30 ENCOUNTER — OUTPATIENT (OUTPATIENT)
Dept: OUTPATIENT SERVICES | Facility: HOSPITAL | Age: 74
LOS: 1 days | Discharge: HOME | End: 2024-05-30
Payer: MEDICARE

## 2024-05-30 ENCOUNTER — RESULT REVIEW (OUTPATIENT)
Age: 74
End: 2024-05-30

## 2024-05-30 ENCOUNTER — TRANSCRIPTION ENCOUNTER (OUTPATIENT)
Age: 74
End: 2024-05-30

## 2024-05-30 ENCOUNTER — APPOINTMENT (OUTPATIENT)
Dept: UROLOGY | Facility: AMBULATORY SURGERY CENTER | Age: 74
End: 2024-05-30

## 2024-05-30 VITALS
WEIGHT: 173.28 LBS | RESPIRATION RATE: 16 BRPM | SYSTOLIC BLOOD PRESSURE: 132 MMHG | OXYGEN SATURATION: 98 % | TEMPERATURE: 98 F | DIASTOLIC BLOOD PRESSURE: 66 MMHG | HEIGHT: 68 IN | HEART RATE: 67 BPM

## 2024-05-30 VITALS
HEART RATE: 83 BPM | DIASTOLIC BLOOD PRESSURE: 72 MMHG | RESPIRATION RATE: 16 BRPM | SYSTOLIC BLOOD PRESSURE: 111 MMHG | OXYGEN SATURATION: 97 % | TEMPERATURE: 97 F

## 2024-05-30 DIAGNOSIS — Z90.89 ACQUIRED ABSENCE OF OTHER ORGANS: Chronic | ICD-10-CM

## 2024-05-30 DIAGNOSIS — Z90.49 ACQUIRED ABSENCE OF OTHER SPECIFIED PARTS OF DIGESTIVE TRACT: Chronic | ICD-10-CM

## 2024-05-30 DIAGNOSIS — Z98.890 OTHER SPECIFIED POSTPROCEDURAL STATES: Chronic | ICD-10-CM

## 2024-05-30 PROCEDURE — G0416: CPT | Mod: 26

## 2024-05-30 PROCEDURE — 88344 IMHCHEM/IMCYTCHM EA MLT ANTB: CPT | Mod: 26

## 2024-05-30 PROCEDURE — 55706 BX PRST8 NDL SAT SAMPLING: CPT

## 2024-05-30 PROCEDURE — 76999F: CUSTOM | Mod: 26

## 2024-05-30 RX ORDER — ROSUVASTATIN CALCIUM 5 MG/1
1 TABLET ORAL
Refills: 0 | DISCHARGE

## 2024-05-30 RX ORDER — EZETIMIBE 10 MG/1
1 TABLET ORAL
Refills: 0 | DISCHARGE

## 2024-05-30 RX ORDER — SODIUM CHLORIDE 9 MG/ML
500 INJECTION, SOLUTION INTRAVENOUS
Refills: 0 | Status: DISCONTINUED | OUTPATIENT
Start: 2024-05-30 | End: 2024-05-30

## 2024-05-30 RX ORDER — ONDANSETRON 8 MG/1
4 TABLET, FILM COATED ORAL ONCE
Refills: 0 | Status: DISCONTINUED | OUTPATIENT
Start: 2024-05-30 | End: 2024-05-30

## 2024-05-30 RX ORDER — OMEPRAZOLE 10 MG/1
1 CAPSULE, DELAYED RELEASE ORAL
Qty: 0 | Refills: 0 | DISCHARGE

## 2024-05-30 RX ORDER — ASPIRIN/CALCIUM CARB/MAGNESIUM 324 MG
1 TABLET ORAL
Qty: 0 | Refills: 0 | DISCHARGE

## 2024-05-30 RX ORDER — FENTANYL CITRATE 50 UG/ML
25 INJECTION INTRAVENOUS
Refills: 0 | Status: DISCONTINUED | OUTPATIENT
Start: 2024-05-30 | End: 2024-05-30

## 2024-05-30 RX ORDER — ACETAMINOPHEN 500 MG
1000 TABLET ORAL ONCE
Refills: 0 | Status: DISCONTINUED | OUTPATIENT
Start: 2024-05-30 | End: 2024-05-30

## 2024-05-30 RX ADMIN — SODIUM CHLORIDE 100 MILLILITER(S): 9 INJECTION, SOLUTION INTRAVENOUS at 10:53

## 2024-05-30 NOTE — ASU DISCHARGE PLAN (ADULT/PEDIATRIC) - ASU DC SPECIAL INSTRUCTIONSFT
Expect Blood in stool and urine for the next few days. Change dressing and keep covered as necessary with gauze and bacitracin. For pain take tylenol as directed on bottle every 4-6 hours for pain. Ice to area as needed.**see post op instruction sheet for detailed instructions**

## 2024-05-30 NOTE — PRE-ANESTHESIA EVALUATION ADULT - NSANTHOSAYNRD_GEN_A_CORE
No. LENIN screening performed.  STOP BANG Legend: 0-2 = LOW Risk; 3-4 = INTERMEDIATE Risk; 5-8 = HIGH Risk

## 2024-05-30 NOTE — BRIEF OPERATIVE NOTE - NSICDXBRIEFPROCEDURE_GEN_ALL_CORE_FT
PROCEDURES:  Biopsy of prostate using magnetic resonance imaging-ultrasound fusion guidance 30-May-2024 10:18:02  Eric Shell

## 2024-05-30 NOTE — ASU DISCHARGE PLAN (ADULT/PEDIATRIC) - CARE PROVIDER_API CALL
Elizabeth Adams  Urology  [address] [facility] [place] [address]  Phone: [phone]  Fax: [phone]  Follow Up Time: 2 weeks

## 2024-05-31 ENCOUNTER — NON-APPOINTMENT (OUTPATIENT)
Age: 74
End: 2024-05-31

## 2024-06-04 ENCOUNTER — NON-APPOINTMENT (OUTPATIENT)
Age: 74
End: 2024-06-04

## 2024-06-04 LAB — SURGICAL PATHOLOGY STUDY: SIGNIFICANT CHANGE UP

## 2024-06-24 ENCOUNTER — TRANSCRIPTION ENCOUNTER (OUTPATIENT)
Age: 74
End: 2024-06-24

## 2024-06-24 ENCOUNTER — APPOINTMENT (OUTPATIENT)
Dept: UROLOGY | Facility: CLINIC | Age: 74
End: 2024-06-24
Payer: MEDICARE

## 2024-06-24 VITALS — DIASTOLIC BLOOD PRESSURE: 71 MMHG | OXYGEN SATURATION: 97 % | SYSTOLIC BLOOD PRESSURE: 123 MMHG | TEMPERATURE: 98.2 F

## 2024-06-24 DIAGNOSIS — C61 MALIGNANT NEOPLASM OF PROSTATE: ICD-10-CM

## 2024-06-24 PROCEDURE — 99214 OFFICE O/P EST MOD 30 MIN: CPT

## 2024-06-24 RX ORDER — OMEPRAZOLE 40 MG/1
40 CAPSULE, DELAYED RELEASE ORAL
Qty: 1 | Refills: 0 | Status: ACTIVE | COMMUNITY
Start: 2019-09-19 | End: 1900-01-01

## 2024-06-24 NOTE — HISTORY OF PRESENT ILLNESS
[FreeTextEntry1] : Dear MAGDI Aguilar  Thank you so much for the referral to help care for your patient.  Chief Complaint: Prostate Cancer Date of first visit: 10/04/2023  DUARTE VALENCIA, is a 74 year old White race man who presents for Prostate Cancer. He was diagnosed with [place] GG1 (3+3=6) in January (Dr. Curry/Kesha) who initially recommended active surveillance. Presents today for second opinion and consideration of focal therapy. MRI showed anterior lesion and biopsy was only positive at this site.  The follow up fusion biopsy TP correltaed with imaging.  there were two lesions we sampled.  The right anterior apex did not have any imaging correlate.  4/16 cores positive on biopsy.  No known family history for prostate cancer (father  from cirrhosis). Decipher reported 2023 - Risk: low, score: 0.26  Previous surgeries: appendectomy, orthopedic (back/neck), tonsillectomy Meds: Rosuvastatin, omeprazole NKDA Ex-smoker (quit 30+ years ago)  PSA Hx: 4.8 24 4.56 23 4.56 23 3.02 19 2.77 17 2.76 16 2.26 07/02/15 0.87 05  Biopsy Hx: 2024 with Dr. Adams -  Left anterior apex: (HGPIN) - Right anterior apex:( Pramod score 3+3=6) involving 15%  of biopsy material. - Right anterior base: (Pramod score 3+3=6) involving 50% of biopsy material. - Left lateral: (HGPIN) - Midline mid TZA: ([place] score 3+3=6) involving 40%, 60% and 45% of biopsy material. -Left mid TZA: ([place] score 3+3=6) involving 60% and 15% of biopsy material.  2023 with ORI Wilkerson - Target Zone # 1 RMTZ: atypical small acinar proliferation (ASAP), 1.0 cm - Target Zone # 1 RMTZ: adenocarcinoma, GS 6(3+3), GG 1, tumor involving 5% of 1 core, 0.13 cm - Target Zone # 1 RMTZ: adenocarcinoma, GS 6(3+3), GG 1, tumor involving 10% of 1 core, 0.09 cm  Ultrasound Hx: 2021 US Abdomen. Cholelithiasis with nonmobile stone in the neck of the gallbladder. No biliary ductal dilatation. Findings may be seen in the setting of acute cholecystitis in the appropriate clinical context. Correlate with clinical and laboratory findings. If additional imaging is clinically indicated, consider nuclear medicine hepatobiliary scan.  CT Hx: 2021 CT Abdomen and Pelvis. Acute cholecystitis. No intra or extrahepatic biliary ductal dilatation. Trace right pleural effusion. Trace perihepatic free fluid adjacent to the right hepatic lobe is likely reactive and related to scute cholecystitis.  MRI in [facility] 5/10/24 Stable lesion in the midline TZa compared with prior MRI 2023. No new prostate lesions. PIRADS 4 - High (clinically significant cancer is likely to be present) PRECISE 3 - Stable MRI appearance: no new focal/diffuse lesions Asymmetric wall thickening in the sigmoid colon, likely also seen on prior MRI 2023. Differential includes chronic diverticular disease versus colon neoplasm. Consider correlation with colonoscopy if not recently performed. Prostate Volume: 32.1 mL PSA density: Unknown ng/mL/mL *Lesion able to be identified with DWI restriction and ADC abnormality as far back as , MRI negative read (, )*  MRI read 2023 at [facility]. 33 cc prostate with PIRADS 3 lesion measuring 1 cm in the mid gland midline anterior transition zone. No LAD No EPE, No Bony Lesions. The images have been reviewed and clinical implications discussed with the patient.  MRI read 2020 at [facility]. 36 cc prostate with PIRADS 2 no lesion. No LAD No EPE, No Bony Lesions. The images have been reviewed and clinical implications discussed with the patient.  MRI read 2019 at [facility]. 23 cc prostate with PIRADS 2 no lesion. No LAD No EPE, No Bony Lesions. The images have been reviewed and clinical implications discussed with the patient.  MRI read 2018 at [facility]. 18 cc prostate with PIRADS 3 lesion measuring 6 mm in the midline, anterior, midgland, transition zone. No LAD No EPE, No Bony Lesions. The images have been reviewed and clinical implications discussed with the patient.  2024 IPSS      QOL JOEY  2024 IPSS 21 QOL 2 JOEY refused  10/04/2023 IPSS 12 QOL 2 JOEY NSA EPIC 26  The patient denies fevers, chills, nausea and or vomiting and no unexplained weight loss.  All pertinent laboratory, films and physician notes were reviewed. Questionnaire results were discussed with patient.  I spent 31 minutes of non-face-to-face time reviewing the patient's records chart, uploading processing MR images, transferring MR images from PACS to an independent workstation, reviewing images on an independent workstation, speaking with their physician and planning their prostate biopsy and preparation of an upcoming visit for 5/15/24 . The imaging and planning was highly complex and took this entire time.   Discussion with the patient: Risks/Benefits/FDA recommendations for prostate cancer screening, the natural history of prostate cancer, the concept of "competing risks", options for treatment including active surveillance, open and laparoscopic (Robotic) radical prostatectomy, external beam radiation therapy, interstitial brachytherapy ("seeds"), combined therapies, hormonal therapies, orchiectomy, and cryotherapy. The potential side effects, early and delayed risks, and effectiveness of each treatment was discussed. The specific details of this patient's disease and their bearing on the above were discussed. The patient was offered the opportunity to meet with the Radiation Oncologists. The patient asked appropriate contextual questions, indicating a good level of understanding.  We discussed the surgical options for management, including robotic and open prostatectomy. The patient understands that the risks of these procedures include bleeding infection, damage to the rectum and surrounding organs, and ED and urinary incontinence, both of which may be persistent. The advantages include removing the entire prostate for more accurate pathologic staging. There is more blood loss with the open approach than with the laparoscopic approach.  We also discussed the options of radiation (external beam, stereotactic body radiation therapy, brachytherapy or combination therapies). Definitive brachytherapy at our institution is done with [id number] either as monotherapy or in combination with external beam radiation (Dependent on risk). The patient understands that the risks of radiation include increased LUTS, diarrhea, hematuria, difficulty urinating, ED, and urinary frequency. Based upon the patient's PSA/pathology and risk stratification, the patient not need androgen deprivation in the neoadjuvant and adjuvant setting if he chooses a radiation monotherapy.  We also discussed active surveillance. The patient understands that this is not a treatment but a way of monitoring potentially indolent disease. The patient understands that this has minimal side effects but there is a risk of disease progression.  We have discussed the use of focal therapy and cancer control. The goal be to treat the high-risk area and follow the low-risk areas which are not clinically significant. I describe the difference between whole gland therapy and focal therapy with respect to cancerous control versus cancer cure. We talked about the risks of focal therapy damage to adjacent structures; however, the probability of urinary incontinence and erectile dysfunction is lower.  The patient and I discussed all of these options as well as their risks and benefits, and I believe I answered his questions. Based on the patient's disease characteristics, I feel the patient would be a good candidate for AS given GG1 with low risk decipher on 1st biopsy - 2nd decipher pending. The patient decided to continue with AS but confirm with Dr Rebolledo  I spent 31 minutes of non-face-to-face time reviewing the patient's records chart, uploading processing MR images, transferring MR images from PACS to an independent workstation, reviewing images on an independent workstation, speaking with their physician and planning their prostate biopsy and preparation of an upcoming visit for 05/15/2024 . The imaging and planning was highly complex and took this entire time.

## 2024-06-24 NOTE — DISEASE MANAGEMENT
[1] : T1 [c] : c [0] : M0 [0-10] : 0 -10 ng/mL [Biopsy with Fusion] : Patient had a biopsy with fusion on [6] : Fusion Biopsy Pramod Score: 6 [Biopsy results sent to PCP/Referring Physician] : Biopsy results sent to PCP/Referring Physician [] : Patient had a Prostate MRI [4] : 4 [BiopsyDate] : 05/30/24 [I] : I

## 2024-06-24 NOTE — PHYSICAL EXAM
[FreeTextEntry1] : 73yo Male with Pramod GG1 (3+3=6) low volume prostate cancer (January 2023) - Very low risk risk stratification. Genomics (Decipher) Low risk; score: 0.26 on initial biopsy.  Follow up biopsy confirmed low risk two anterior lesions.  Decipher 2024/5 pending.  MRI visible lesion as far as 2019, increased characteristics on most recent scan and this was sampled and found to be low risk prostate cancer as well  Summary, 2 GG1 lesions with (RAB overlap) RAA no imaging correlate but small amt decipher pending PSA < 10  AS vs Focal. - typically we observe  - he will follow up with Dr. Rebolledo. Plan as below he has completed confimratory biopsy - and is now in 2nd stage of AS  [facility] Active Surveillance Protocol   GUSTAVO Enrollment then Q3 years PSA Q6 months x5 years THEN annually MRI Q12-24 months x5 years, then Q3 years Biopsy Stable Visible Lesion: Biopsy at month 12, 46 then Q3-5 years or For Cause    No Visible Lesion: Biopsy at month 12, then Q3-5 years if MRI remains stable  TRIGGERS for FOR-CAUSE BIOPSY         New nodule on GUSTAVO        Rising PSA x3 and PSADT <3 years         MRI with lesion progression (PRECISE 4-5)         Increase in PIRADS score or new PIRADS 3-5 lesion         SVI, LVI, SEGUN, LAD        Increase in lesion size   Thank you very much for allowing me to assist in the care of this patient. Please do not hesitate to contact me with any additional questions or concerns.      Sincerely,     Dangelo Adams D.O. Professor of Urology and Radiology  of Urology at [facility] Director for Prostate Cancer [address] [place] [address], [address] [place] [address] Phone: [phone]

## 2024-07-17 ENCOUNTER — NON-APPOINTMENT (OUTPATIENT)
Age: 74
End: 2024-07-17

## 2024-08-07 ENCOUNTER — NON-APPOINTMENT (OUTPATIENT)
Age: 74
End: 2024-08-07

## 2024-08-08 ENCOUNTER — APPOINTMENT (OUTPATIENT)
Dept: DERMATOLOGY | Facility: CLINIC | Age: 74
End: 2024-08-08

## 2024-08-08 PROCEDURE — 99203 OFFICE O/P NEW LOW 30 MIN: CPT | Mod: 25

## 2024-08-08 PROCEDURE — 17003 DESTRUCT PREMALG LES 2-14: CPT

## 2024-08-08 PROCEDURE — 17000 DESTRUCT PREMALG LESION: CPT

## 2024-08-08 NOTE — PHYSICAL EXAM
Subjective:     Interval History:  Over the night, the patient has no complain, no active event happened and change in the treatment plan occurred. Patient denies chest pain, SOB, difficulty in breathing, and had one bowel movement and actively ambulating. Plan for PICC line today and he agrees.     Objective:     Vital Signs (Most Recent):  Temp: 99 °F (37.2 °C) (09/17/18 0343)  Pulse: 67 (09/17/18 0343)  Resp: 18 (09/17/18 0343)  BP: 139/88 (09/17/18 0343)  SpO2: (!) 93 % (09/17/18 0343) Vital Signs (24h Range):  Temp:  [97.3 °F (36.3 °C)-99 °F (37.2 °C)] 99 °F (37.2 °C)  Pulse:  [62-67] 67  Resp:  [18-20] 18  SpO2:  [93 %-98 %] 93 %  BP: (123-148)/(78-88) 139/88     Weight: 83.8 kg (184 lb 11.9 oz)  Body mass index is 25.77 kg/m².  Body surface area is 2.05 meters squared.    ECOG SCORE         ECOG Performance Status Score:  1 Restricted in physically strenuous activity but ambulatory and able to carry out work of a light or sedentary nature, e.g., light house work, office work .    Intake/Output - Last 3 Shifts       09/15 0700 - 09/16 0659 09/16 0700 - 09/17 0659 09/17 0700 - 09/18 0659    P.O. 1920 1480     I.V. (mL/kg) 1882 (22.5) 397.5 (4.7)     Total Intake(mL/kg) 3802 (45.4) 1877.5 (22.4)     Urine (mL/kg/hr) 3775 (1.9) 1850 (0.9)     Stool 0 0     Total Output 3775 1850     Net +27 +27.5            Urine Occurrence  1 x     Stool Occurrence 1 x 1 x           Physical Exam   Constitutional: He appears well-developed and well-nourished.   HENT:   Head: Normocephalic and atraumatic.   Poor dentition, no mucositis   Eyes: EOM are normal. Pupils are equal, round, and reactive to light. No scleral icterus.   Neck: Neck supple.   Cardiovascular: Normal rate and regular rhythm.   Pulmonary/Chest: Effort normal and breath sounds normal. No respiratory distress. He has no wheezes.   Abdominal: Soft. He exhibits no distension. There is no tenderness.   Musculoskeletal: Normal range of motion. He exhibits no  edema.   Lymphadenopathy:     He has no cervical adenopathy.   Neurological: He is alert.   Skin: Skin is warm and dry.   Psychiatric: He has a normal mood and affect. His behavior is normal.       Significant Labs:   CBC:   Recent Labs   Lab  09/16/18 0444 09/17/18 0446   WBC  8.80  7.96   HGB  7.5*  8.4*   HCT  23.4*  26.0*   PLT  27*   --    , CMP:   Recent Labs   Lab  09/16/18 0444 09/17/18 0446   NA  142  140   K  4.3  4.4   CL  109  104   CO2  25  28   GLU  98  110   BUN  10  11   CREATININE  0.8  0.9   CALCIUM  9.0  9.5   PROT  6.5  7.4   ALBUMIN  2.7*  3.1*   BILITOT  0.4  0.6   ALKPHOS  64  77   AST  50*  41*   ALT  76*  74*   ANIONGAP  8  8   EGFRNONAA  >60.0  >60.0     Diagnostic Results:  I have reviewed all pertinent imaging results/findings within the past 24 hours.   [Alert] : alert [Oriented x 3] : ~L oriented x 3 [Well Nourished] : well nourished [Conjunctiva Non-injected] : conjunctiva non-injected [No Visual Lymphadenopathy] : no visual  lymphadenopathy [No Clubbing] : no clubbing [No Edema] : no edema [No Bromhidrosis] : no bromhidrosis [No Chromhidrosis] : no chromhidrosis [FreeTextEntry3] : The patient is well-appearing, in no acute distress, alert and oriented x 3. Mood and affect are normal. A complete cutaneous examination of the scalp, face, neck, chest, abdomen, back, bilateral arms, bilateral legs, buttocks, digits, nails, eyelids, conjunctiva and lips reveals the following significant findings:   gritty erythematous papule on L sideburn and R suprabrow Well-healed scar on the back

## 2024-08-08 NOTE — HISTORY OF PRESENT ILLNESS
[FreeTextEntry1] : spots on the skin [de-identified] : 75yo M w/ PMH of NMSC (L upper back, type unknown, >10 yrs ago) here for follow up of:  1) spots on the skin

## 2024-08-12 ENCOUNTER — TRANSCRIPTION ENCOUNTER (OUTPATIENT)
Age: 74
End: 2024-08-12

## 2024-08-21 ENCOUNTER — APPOINTMENT (OUTPATIENT)
Dept: INTERNAL MEDICINE | Facility: CLINIC | Age: 74
End: 2024-08-21

## 2024-08-22 ENCOUNTER — APPOINTMENT (OUTPATIENT)
Dept: ORTHOPEDIC SURGERY | Facility: CLINIC | Age: 74
End: 2024-08-22
Payer: MEDICARE

## 2024-08-22 VITALS — BODY MASS INDEX: 26.68 KG/M2 | HEIGHT: 67 IN | WEIGHT: 170 LBS

## 2024-08-22 DIAGNOSIS — G56.01 CARPAL TUNNEL SYNDROME, RIGHT UPPER LIMB: ICD-10-CM

## 2024-08-22 PROCEDURE — 99213 OFFICE O/P EST LOW 20 MIN: CPT | Mod: 25

## 2024-08-22 PROCEDURE — 20526 THER INJECTION CARP TUNNEL: CPT | Mod: RT

## 2024-08-22 PROCEDURE — J3490M: CUSTOM | Mod: NC

## 2024-08-22 NOTE — HISTORY OF PRESENT ILLNESS
[7] : 7 [0] : 0 [Dull/Aching] : dull/aching [Localized] : localized [Intermittent] : intermittent [Meds] : meds [Retired] : Work status: retired [de-identified] : R hand and fingers numbness 2 months [] : Post Surgical Visit: no [FreeTextEntry1] : R hand [FreeTextEntry5] : Patient is complaining of numbness in her right hand since the past 2 months. no specific injury,  [FreeTextEntry6] : numbness

## 2024-08-22 NOTE — PHYSICAL EXAM
[de-identified] : R hand:  Tender volar wrist  Good finger ROM  +Tinels  +Phalens  +Compression test  Decreased sensation median nerve distribution +thenar atrophy

## 2024-08-22 NOTE — ASSESSMENT
[FreeTextEntry1] : EMG Light activities- advance as tolerated Ice as needed Return after EMG   Right carpal tunnel injection was performed because of numbness and tingling Anesthesia: ethyl chloride sprayed topically Celestone 6mg: An injection of Celestone 1cc Lidocaine: An injection of Lidocaine 1% 1cc Marcaine: An injection of Marcaine 0.5% 1cc   The risks, benefits, and alternatives to cortisone injection were explained in full to the patient. Risks outlined include but are not limited to infection, sepsis, bleeding, scarring, skin discoloration, temporary increase in pain, syncopal episode, failure to resolve symptoms, allergic reaction, symptom recurrence, and elevation of blood sugar in diabetics. Patient understood the risks. All questions were answered. After discussion of options, patient verbally consented to an injection. Sterile prep was done of the injection site. Patient tolerated the procedure well. Advised to ice the injection site this evening.

## 2024-08-29 ENCOUNTER — APPOINTMENT (OUTPATIENT)
Dept: NEUROLOGY | Facility: CLINIC | Age: 74
End: 2024-08-29
Payer: MEDICARE

## 2024-08-29 PROCEDURE — 95913 NRV CNDJ TEST 13/> STUDIES: CPT

## 2024-08-29 PROCEDURE — 95886 MUSC TEST DONE W/N TEST COMP: CPT

## 2024-09-05 ENCOUNTER — APPOINTMENT (OUTPATIENT)
Dept: ORTHOPEDIC SURGERY | Facility: CLINIC | Age: 74
End: 2024-09-05
Payer: MEDICARE

## 2024-09-05 VITALS — HEIGHT: 67 IN | WEIGHT: 170 LBS | BODY MASS INDEX: 26.68 KG/M2

## 2024-09-05 DIAGNOSIS — G56.01 CARPAL TUNNEL SYNDROME, RIGHT UPPER LIMB: ICD-10-CM

## 2024-09-05 DIAGNOSIS — G56.02 CARPAL TUNNEL SYNDROME, LEFT UPPER LIMB: ICD-10-CM

## 2024-09-05 PROCEDURE — 99213 OFFICE O/P EST LOW 20 MIN: CPT

## 2024-09-05 NOTE — HISTORY OF PRESENT ILLNESS
[5] : 5 [Dull/Aching] : dull/aching [de-identified] : R CT Injection last visit helped Still has numbness  I independently reviewed the EMG and my interpretation is there is slowing of latencies severely R CT; moderate on L  [7] : 7 [Tingling] : tingling [FreeTextEntry1] : R wrist [de-identified] : inj

## 2024-09-05 NOTE — ASSESSMENT
[FreeTextEntry1] : I discussed R CTR He declines Will return in winter to consider injection  Consider CTR in the future

## 2024-10-02 ENCOUNTER — APPOINTMENT (OUTPATIENT)
Dept: INTERNAL MEDICINE | Facility: CLINIC | Age: 74
End: 2024-10-02
Payer: MEDICARE

## 2024-10-02 VITALS
SYSTOLIC BLOOD PRESSURE: 116 MMHG | BODY MASS INDEX: 27.47 KG/M2 | TEMPERATURE: 97.4 F | HEART RATE: 70 BPM | OXYGEN SATURATION: 96 % | HEIGHT: 67 IN | DIASTOLIC BLOOD PRESSURE: 76 MMHG | WEIGHT: 175 LBS

## 2024-10-02 VITALS
WEIGHT: 170 LBS | HEIGHT: 67 IN | DIASTOLIC BLOOD PRESSURE: 80 MMHG | SYSTOLIC BLOOD PRESSURE: 130 MMHG | BODY MASS INDEX: 26.68 KG/M2

## 2024-10-02 DIAGNOSIS — K22.70 BARRETT'S ESOPHAGUS W/OUT DYSPLASIA: ICD-10-CM

## 2024-10-02 DIAGNOSIS — K57.92 DIVERTICULITIS OF INTESTINE, PART UNSPECIFIED, W/OUT PERFORATION OR ABSCESS W/OUT BLEEDING: ICD-10-CM

## 2024-10-02 DIAGNOSIS — K21.9 GASTRO-ESOPHAGEAL REFLUX DISEASE W/OUT ESOPHAGITIS: ICD-10-CM

## 2024-10-02 PROCEDURE — 99203 OFFICE O/P NEW LOW 30 MIN: CPT

## 2024-10-02 NOTE — ASSESSMENT
[FreeTextEntry1] : Barretts esophagus and active gerd continue omeprazole 40 daily last upper endoscopy 5 years ago need repeat to r/o dysplasia  hx of recurrent diverticulitis last colon 5 years ago' natural course of disease, prevention and diet discussed [facility]  need tele visit and labs and procedure May or june 2025

## 2024-10-02 NOTE — HISTORY OF PRESENT ILLNESS
[FreeTextEntry1] : Last colon 5 years ago with diverticulosis has had bout of diverticulitis recent prostate cancer under observation  On omeprazole 40 daily hx of esophagitis ans Barretts Becomes symptomatic when misses pill last upper endoscopy 5 years ago otherwise well s/p lap choly

## 2024-12-19 ENCOUNTER — APPOINTMENT (OUTPATIENT)
Dept: ORTHOPEDIC SURGERY | Facility: CLINIC | Age: 74
End: 2024-12-19
Payer: MEDICARE

## 2024-12-19 VITALS — HEIGHT: 67 IN | WEIGHT: 175 LBS | BODY MASS INDEX: 27.47 KG/M2

## 2024-12-19 DIAGNOSIS — G56.01 CARPAL TUNNEL SYNDROME, RIGHT UPPER LIMB: ICD-10-CM

## 2024-12-19 PROCEDURE — 99213 OFFICE O/P EST LOW 20 MIN: CPT | Mod: 25

## 2025-01-23 NOTE — ED ADULT NURSE NOTE - PAIN RATING/NUMBER SCALE (0-10): REST
What Type Of Note Output Would You Prefer (Optional)?: Standard Output How Severe Are Your Spot(S)?: mild Have Your Spot(S) Been Treated In The Past?: has not been treated Hpi Title: Evaluation of Skin Lesions 2

## 2025-02-27 NOTE — PATIENT PROFILE ADULT - FALL HARM RISK CONCLUSION
Called mom and scheduled shot apt. 
School nurse called mom to advise she needs a dose of DTaP/ IPV. Please call mom to scheduled.   
Fall with Harm Risk

## 2025-04-29 DIAGNOSIS — Z00.00 ENCOUNTER FOR GENERAL ADULT MEDICAL EXAMINATION W/OUT ABNORMAL FINDINGS: ICD-10-CM

## 2025-05-06 ENCOUNTER — APPOINTMENT (OUTPATIENT)
Dept: INTERNAL MEDICINE | Facility: CLINIC | Age: 75
End: 2025-05-06
Payer: MEDICARE

## 2025-05-06 VITALS — WEIGHT: 275 LBS | HEIGHT: 67 IN | BODY MASS INDEX: 43.16 KG/M2

## 2025-05-06 DIAGNOSIS — K22.70 BARRETT'S ESOPHAGUS W/OUT DYSPLASIA: ICD-10-CM

## 2025-05-06 PROCEDURE — 99212 OFFICE O/P EST SF 10 MIN: CPT | Mod: 2W

## 2025-05-06 RX ORDER — EZETIMIBE 10 MG/1
10 TABLET ORAL
Qty: 1 | Refills: 3 | Status: ACTIVE | COMMUNITY
Start: 2025-05-06

## 2025-05-15 ENCOUNTER — LABORATORY RESULT (OUTPATIENT)
Age: 75
End: 2025-05-15

## 2025-05-15 ENCOUNTER — APPOINTMENT (OUTPATIENT)
Dept: INTERNAL MEDICINE | Facility: CLINIC | Age: 75
End: 2025-05-15
Payer: MEDICARE

## 2025-05-15 DIAGNOSIS — Z12.11 ENCOUNTER FOR SCREENING FOR MALIGNANT NEOPLASM OF COLON: ICD-10-CM

## 2025-05-15 DIAGNOSIS — K22.70 BARRETT'S ESOPHAGUS W/OUT DYSPLASIA: ICD-10-CM

## 2025-05-15 DIAGNOSIS — K57.90 DIVERTICULOSIS OF INTESTINE, PART UNSPECIFIED, W/OUT PERFORATION OR ABSCESS W/OUT BLEEDING: ICD-10-CM

## 2025-05-15 DIAGNOSIS — K44.9 DIAPHRAGMATIC HERNIA W/OUT OBSTRUCTION OR GANGRENE: ICD-10-CM

## 2025-05-15 PROCEDURE — 45378 DIAGNOSTIC COLONOSCOPY: CPT

## 2025-05-15 PROCEDURE — 43239 EGD BIOPSY SINGLE/MULTIPLE: CPT | Mod: 59

## 2025-05-22 ENCOUNTER — APPOINTMENT (OUTPATIENT)
Dept: ORTHOPEDIC SURGERY | Facility: CLINIC | Age: 75
End: 2025-05-22
Payer: MEDICARE

## 2025-05-22 VITALS — WEIGHT: 175 LBS | BODY MASS INDEX: 27.47 KG/M2 | HEIGHT: 67 IN

## 2025-05-22 DIAGNOSIS — G56.01 CARPAL TUNNEL SYNDROME, RIGHT UPPER LIMB: ICD-10-CM

## 2025-05-22 PROCEDURE — 99214 OFFICE O/P EST MOD 30 MIN: CPT

## 2025-06-09 RX ORDER — HYDROCODONE BITARTRATE AND ACETAMINOPHEN 5; 325 MG/1; MG/1
5-325 TABLET ORAL
Qty: 10 | Refills: 0 | Status: ACTIVE | COMMUNITY
Start: 2025-06-09 | End: 1900-01-01

## 2025-06-11 ENCOUNTER — APPOINTMENT (OUTPATIENT)
Dept: ORTHOPEDIC SURGERY | Facility: AMBULATORY SURGERY CENTER | Age: 75
End: 2025-06-11
Payer: MEDICARE

## 2025-06-11 PROCEDURE — 64721 CARPAL TUNNEL SURGERY: CPT | Mod: AS,RT

## 2025-06-11 PROCEDURE — 64721 CARPAL TUNNEL SURGERY: CPT | Mod: RT

## 2025-06-19 ENCOUNTER — APPOINTMENT (OUTPATIENT)
Dept: ORTHOPEDIC SURGERY | Facility: CLINIC | Age: 75
End: 2025-06-19
Payer: MEDICARE

## 2025-06-19 VITALS — WEIGHT: 175 LBS | BODY MASS INDEX: 27.47 KG/M2 | HEIGHT: 67 IN

## 2025-06-19 PROCEDURE — 99024 POSTOP FOLLOW-UP VISIT: CPT

## 2025-08-18 ENCOUNTER — APPOINTMENT (OUTPATIENT)
Dept: ORTHOPEDIC SURGERY | Facility: CLINIC | Age: 75
End: 2025-08-18
Payer: MEDICARE

## 2025-08-18 VITALS — WEIGHT: 175 LBS | HEIGHT: 67 IN | BODY MASS INDEX: 27.47 KG/M2

## 2025-08-18 DIAGNOSIS — M65.341 TRIGGER FINGER, RIGHT RING FINGER: ICD-10-CM

## 2025-08-18 PROCEDURE — 20550 NJX 1 TENDON SHEATH/LIGAMENT: CPT | Mod: 79,F8

## 2025-08-18 PROCEDURE — J3490M: CUSTOM | Mod: NC,JZ

## 2025-08-18 PROCEDURE — 99213 OFFICE O/P EST LOW 20 MIN: CPT | Mod: 24,25

## 2025-08-22 ENCOUNTER — TRANSCRIPTION ENCOUNTER (OUTPATIENT)
Age: 75
End: 2025-08-22

## (undated) DEVICE — SYR CATH TIP 2 OZ

## (undated) DEVICE — GLV 8 PROTEXIS (WHITE)

## (undated) DEVICE — PREP CHLORAPREP HI-LITE ORANGE 26ML

## (undated) DEVICE — SYR LUER LOK 50CC

## (undated) DEVICE — NDL MAX CORE 18G X 25CM

## (undated) DEVICE — PLASTIC SOLUTION BOWL 160Z

## (undated) DEVICE — NDL HYPO REGULAR BEVEL 25G X 1.5" (BLUE)

## (undated) DEVICE — DRAPE MEDIUM SHEET 44" X 70"

## (undated) DEVICE — SYR LUER LOK 10CC

## (undated) DEVICE — DRAPE TOWEL BLUE 17" X 24"

## (undated) DEVICE — BALLOON ENDOCAVITY 2X14CM

## (undated) DEVICE — NDL BIOPSY CHIBA 22G X 20CM

## (undated) DEVICE — GRID BRACHYTHERAPY EZ 18G

## (undated) DEVICE — DRSG TELFA 3 X 8